# Patient Record
Sex: MALE | Race: WHITE | Employment: UNEMPLOYED | URBAN - NONMETROPOLITAN AREA
[De-identification: names, ages, dates, MRNs, and addresses within clinical notes are randomized per-mention and may not be internally consistent; named-entity substitution may affect disease eponyms.]

---

## 2021-06-19 ENCOUNTER — HOSPITAL ENCOUNTER (EMERGENCY)
Age: 56
Discharge: HOME | End: 2021-06-19
Attending: EMERGENCY MEDICINE

## 2021-06-19 VITALS
HEIGHT: 69 IN | SYSTOLIC BLOOD PRESSURE: 118 MMHG | RESPIRATION RATE: 18 BRPM | TEMPERATURE: 98 F | WEIGHT: 180 LBS | BODY MASS INDEX: 26.66 KG/M2 | DIASTOLIC BLOOD PRESSURE: 86 MMHG | OXYGEN SATURATION: 100 % | HEART RATE: 90 BPM

## 2021-06-19 DIAGNOSIS — S00.12XA PERIORBITAL ECCHYMOSIS OF LEFT EYE, INITIAL ENCOUNTER: ICD-10-CM

## 2021-06-19 DIAGNOSIS — H01.005 BLEPHARITIS OF LEFT LOWER EYELID, UNSPECIFIED TYPE: Primary | ICD-10-CM

## 2021-06-19 DIAGNOSIS — S00.81XA ABRASION OF FACE, INITIAL ENCOUNTER: ICD-10-CM

## 2021-06-19 PROCEDURE — 6370000000 HC RX 637 (ALT 250 FOR IP): Performed by: EMERGENCY MEDICINE

## 2021-06-19 PROCEDURE — 99283 EMERGENCY DEPT VISIT LOW MDM: CPT

## 2021-06-19 RX ORDER — ERYTHROMYCIN 5 MG/G
OINTMENT OPHTHALMIC ONCE
Status: COMPLETED | OUTPATIENT
Start: 2021-06-19 | End: 2021-06-19

## 2021-06-19 RX ORDER — ERYTHROMYCIN 5 MG/G
OINTMENT OPHTHALMIC EVERY 6 HOURS
Qty: 1 TUBE | Refills: 0 | Status: SHIPPED | OUTPATIENT
Start: 2021-06-19 | End: 2021-06-24

## 2021-06-19 RX ADMIN — ERYTHROMYCIN: 5 OINTMENT OPHTHALMIC at 19:58

## 2021-06-19 ASSESSMENT — PAIN DESCRIPTION - LOCATION: LOCATION: EYE

## 2021-06-19 ASSESSMENT — VISUAL ACUITY
OD: 20/25
OS: 20/25
OU: 20/15
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
## 2021-06-20 ASSESSMENT — ENCOUNTER SYMPTOMS
EYE REDNESS: 1
BACK PAIN: 0
VOMITING: 0
WHEEZING: 0
SHORTNESS OF BREATH: 0
COLOR CHANGE: 0
NAUSEA: 0
COUGH: 0
EYE DISCHARGE: 1
SORE THROAT: 0
CHEST TIGHTNESS: 0
SINUS PAIN: 1
SINUS PRESSURE: 0
EYE ITCHING: 1
EYE PAIN: 1
FACIAL SWELLING: 1
PHOTOPHOBIA: 0
ABDOMINAL PAIN: 0

## 2021-06-20 ASSESSMENT — TONOMETRY
OS_IOP_MMHG: 11
IOP_AUTOMATED: 1
OD_IOP_MMHG: 21

## 2022-12-26 ENCOUNTER — HOSPITAL ENCOUNTER (INPATIENT)
Age: 57
LOS: 8 days | Discharge: HOME | DRG: 885 | End: 2023-01-03
Attending: EMERGENCY MEDICINE | Admitting: PSYCHIATRY & NEUROLOGY
Payer: MEDICAID

## 2022-12-26 DIAGNOSIS — Z76.89 ENCOUNTER FOR PSYCHIATRIC ASSESSMENT: Primary | ICD-10-CM

## 2022-12-26 DIAGNOSIS — R45.851 SUICIDAL THOUGHTS: ICD-10-CM

## 2022-12-26 LAB
A/G RATIO: 1.3 (ref 1.1–2.2)
ACETAMINOPHEN LEVEL: <5 UG/ML (ref 10–30)
ALBUMIN SERPL-MCNC: 4.2 G/DL (ref 3.4–5)
ALP BLD-CCNC: 127 U/L (ref 40–129)
ALT SERPL-CCNC: 21 U/L (ref 10–40)
AMPHETAMINE SCREEN, URINE: POSITIVE
ANION GAP SERPL CALCULATED.3IONS-SCNC: 9 MMOL/L (ref 3–16)
AST SERPL-CCNC: 20 U/L (ref 15–37)
BARBITURATE SCREEN URINE: ABNORMAL
BASOPHILS ABSOLUTE: 0 K/UL (ref 0–0.2)
BASOPHILS RELATIVE PERCENT: 0.5 %
BENZODIAZEPINE SCREEN, URINE: ABNORMAL
BILIRUB SERPL-MCNC: <0.2 MG/DL (ref 0–1)
BUN BLDV-MCNC: 22 MG/DL (ref 7–20)
CALCIUM SERPL-MCNC: 9.3 MG/DL (ref 8.3–10.6)
CANNABINOID SCREEN URINE: POSITIVE
CHLORIDE BLD-SCNC: 102 MMOL/L (ref 99–110)
CO2: 27 MMOL/L (ref 21–32)
COCAINE METABOLITE SCREEN URINE: ABNORMAL
CREAT SERPL-MCNC: 1.3 MG/DL (ref 0.9–1.3)
EOSINOPHILS ABSOLUTE: 0.1 K/UL (ref 0–0.6)
EOSINOPHILS RELATIVE PERCENT: 1.2 %
ETHANOL: 11 MG/DL (ref 0–0.08)
FENTANYL SCREEN, URINE: ABNORMAL
GFR SERPL CREATININE-BSD FRML MDRD: >60 ML/MIN/{1.73_M2}
GLUCOSE BLD-MCNC: 124 MG/DL (ref 70–99)
HCT VFR BLD CALC: 43.7 % (ref 40.5–52.5)
HEMOGLOBIN: 14.4 G/DL (ref 13.5–17.5)
INFLUENZA A: NOT DETECTED
INFLUENZA B: NOT DETECTED
LYMPHOCYTES ABSOLUTE: 2.2 K/UL (ref 1–5.1)
LYMPHOCYTES RELATIVE PERCENT: 23.8 %
Lab: ABNORMAL
MCH RBC QN AUTO: 29.8 PG (ref 26–34)
MCHC RBC AUTO-ENTMCNC: 32.9 G/DL (ref 31–36)
MCV RBC AUTO: 90.5 FL (ref 80–100)
METHADONE SCREEN, URINE: ABNORMAL
MONOCYTES ABSOLUTE: 0.6 K/UL (ref 0–1.3)
MONOCYTES RELATIVE PERCENT: 7.1 %
NEUTROPHILS ABSOLUTE: 6.1 K/UL (ref 1.7–7.7)
NEUTROPHILS RELATIVE PERCENT: 67.4 %
OPIATE SCREEN URINE: ABNORMAL
OXYCODONE URINE: ABNORMAL
PDW BLD-RTO: 13.6 % (ref 12.4–15.4)
PH UA: 7
PHENCYCLIDINE SCREEN URINE: ABNORMAL
PLATELET # BLD: 250 K/UL (ref 135–450)
PMV BLD AUTO: 7.1 FL (ref 5–10.5)
POTASSIUM REFLEX MAGNESIUM: 3.9 MMOL/L (ref 3.5–5.1)
RBC # BLD: 4.83 M/UL (ref 4.2–5.9)
SALICYLATE, SERUM: <0.3 MG/DL (ref 15–30)
SARS-COV-2 RNA, RT PCR: NOT DETECTED
SODIUM BLD-SCNC: 138 MMOL/L (ref 136–145)
TOTAL PROTEIN: 7.4 G/DL (ref 6.4–8.2)
WBC # BLD: 9.1 K/UL (ref 4–11)

## 2022-12-26 PROCEDURE — 36415 COLL VENOUS BLD VENIPUNCTURE: CPT

## 2022-12-26 PROCEDURE — 1240000000 HC EMOTIONAL WELLNESS R&B

## 2022-12-26 PROCEDURE — 85025 COMPLETE CBC W/AUTO DIFF WBC: CPT

## 2022-12-26 PROCEDURE — 80307 DRUG TEST PRSMV CHEM ANLYZR: CPT

## 2022-12-26 PROCEDURE — 80053 COMPREHEN METABOLIC PANEL: CPT

## 2022-12-26 PROCEDURE — 99285 EMERGENCY DEPT VISIT HI MDM: CPT

## 2022-12-26 PROCEDURE — 82077 ASSAY SPEC XCP UR&BREATH IA: CPT

## 2022-12-26 PROCEDURE — 87636 SARSCOV2 & INF A&B AMP PRB: CPT

## 2022-12-26 PROCEDURE — 80143 DRUG ASSAY ACETAMINOPHEN: CPT

## 2022-12-26 PROCEDURE — 80179 DRUG ASSAY SALICYLATE: CPT

## 2022-12-26 ASSESSMENT — PAIN SCALES - GENERAL: PAINLEVEL_OUTOF10: 3

## 2022-12-26 ASSESSMENT — PAIN DESCRIPTION - FREQUENCY: FREQUENCY: CONTINUOUS

## 2022-12-26 ASSESSMENT — PAIN DESCRIPTION - LOCATION: LOCATION: BACK

## 2022-12-26 ASSESSMENT — PAIN DESCRIPTION - PAIN TYPE: TYPE: CHRONIC PAIN

## 2022-12-26 ASSESSMENT — PAIN - FUNCTIONAL ASSESSMENT: PAIN_FUNCTIONAL_ASSESSMENT: 0-10

## 2022-12-27 PROBLEM — F33.2 SEVERE EPISODE OF RECURRENT MAJOR DEPRESSIVE DISORDER, WITHOUT PSYCHOTIC FEATURES (HCC): Status: ACTIVE | Noted: 2022-12-26

## 2022-12-27 PROBLEM — F10.21 ALCOHOL USE DISORDER, SEVERE, IN SUSTAINED REMISSION (HCC): Status: ACTIVE | Noted: 2022-12-27

## 2022-12-27 PROBLEM — F15.10 METHAMPHETAMINE USE DISORDER, MILD (HCC): Status: ACTIVE | Noted: 2022-12-27

## 2022-12-27 PROBLEM — F14.21 COCAINE USE DISORDER, SEVERE, IN SUSTAINED REMISSION (HCC): Status: ACTIVE | Noted: 2022-12-27

## 2022-12-27 PROBLEM — G89.29 CHRONIC LOW BACK PAIN: Status: ACTIVE | Noted: 2022-12-27

## 2022-12-27 PROBLEM — F12.20 CANNABIS USE DISORDER, MODERATE, DEPENDENCE (HCC): Status: ACTIVE | Noted: 2022-12-27

## 2022-12-27 PROBLEM — M54.50 CHRONIC LOW BACK PAIN: Status: ACTIVE | Noted: 2022-12-27

## 2022-12-27 PROCEDURE — 6370000000 HC RX 637 (ALT 250 FOR IP): Performed by: PSYCHIATRY & NEUROLOGY

## 2022-12-27 PROCEDURE — 1240000000 HC EMOTIONAL WELLNESS R&B

## 2022-12-27 RX ORDER — CLONAZEPAM 1 MG/1
1 TABLET ORAL ONCE
Status: COMPLETED | OUTPATIENT
Start: 2022-12-27 | End: 2022-12-27

## 2022-12-27 RX ORDER — TRAZODONE HYDROCHLORIDE 50 MG/1
50 TABLET ORAL NIGHTLY PRN
Status: DISCONTINUED | OUTPATIENT
Start: 2022-12-27 | End: 2023-01-03 | Stop reason: HOSPADM

## 2022-12-27 RX ORDER — POLYETHYLENE GLYCOL 3350 17 G
2 POWDER IN PACKET (EA) ORAL
Status: DISCONTINUED | OUTPATIENT
Start: 2022-12-27 | End: 2023-01-03 | Stop reason: HOSPADM

## 2022-12-27 RX ORDER — ESCITALOPRAM OXALATE 10 MG/1
10 TABLET ORAL DAILY
Status: DISCONTINUED | OUTPATIENT
Start: 2022-12-28 | End: 2023-01-03 | Stop reason: HOSPADM

## 2022-12-27 RX ORDER — ACETAMINOPHEN 325 MG/1
650 TABLET ORAL EVERY 6 HOURS PRN
Status: DISCONTINUED | OUTPATIENT
Start: 2022-12-27 | End: 2022-12-28

## 2022-12-27 RX ORDER — HYDROXYZINE 50 MG/1
50 TABLET, FILM COATED ORAL 3 TIMES DAILY PRN
Status: DISCONTINUED | OUTPATIENT
Start: 2022-12-27 | End: 2023-01-03 | Stop reason: HOSPADM

## 2022-12-27 RX ORDER — ESCITALOPRAM OXALATE 10 MG/1
5 TABLET ORAL DAILY
Status: DISPENSED | OUTPATIENT
Start: 2022-12-27 | End: 2022-12-28

## 2022-12-27 RX ADMIN — CLONAZEPAM 1 MG: 1 TABLET ORAL at 21:11

## 2022-12-27 ASSESSMENT — SLEEP AND FATIGUE QUESTIONNAIRES
DO YOU HAVE DIFFICULTY SLEEPING: NO
AVERAGE NUMBER OF SLEEP HOURS: 12
DO YOU USE A SLEEP AID: NO
DO YOU USE A SLEEP AID: NO
AVERAGE NUMBER OF SLEEP HOURS: 10
DO YOU HAVE DIFFICULTY SLEEPING: NO

## 2022-12-27 ASSESSMENT — PAIN DESCRIPTION - DESCRIPTORS: DESCRIPTORS: ACHING;THROBBING

## 2022-12-27 ASSESSMENT — PATIENT HEALTH QUESTIONNAIRE - PHQ9
SUM OF ALL RESPONSES TO PHQ QUESTIONS 1-9: 18
SUM OF ALL RESPONSES TO PHQ QUESTIONS 1-9: 18

## 2022-12-27 ASSESSMENT — PAIN DESCRIPTION - FREQUENCY: FREQUENCY: CONTINUOUS

## 2022-12-27 ASSESSMENT — PAIN DESCRIPTION - LOCATION: LOCATION: BACK

## 2022-12-27 ASSESSMENT — PAIN DESCRIPTION - PAIN TYPE: TYPE: CHRONIC PAIN

## 2022-12-27 ASSESSMENT — PAIN DESCRIPTION - ORIENTATION: ORIENTATION: LOWER

## 2022-12-27 ASSESSMENT — PAIN - FUNCTIONAL ASSESSMENT: PAIN_FUNCTIONAL_ASSESSMENT: ACTIVITIES ARE NOT PREVENTED

## 2022-12-27 ASSESSMENT — PAIN SCALES - GENERAL: PAINLEVEL_OUTOF10: 3

## 2022-12-27 ASSESSMENT — LIFESTYLE VARIABLES
HOW OFTEN DO YOU HAVE A DRINK CONTAINING ALCOHOL: NEVER
HOW MANY STANDARD DRINKS CONTAINING ALCOHOL DO YOU HAVE ON A TYPICAL DAY: PATIENT DOES NOT DRINK

## 2022-12-27 NOTE — PROGRESS NOTES
Behavioral Services  Medicare Certification Upon Admission    I certify that this patient's inpatient psychiatric hospital admission is medically necessary for:    [x] (1) Treatment which could reasonably be expected to improve this patient's condition,       [x] (2) Or for diagnostic study;     AND     [x](2) The inpatient psychiatric services are provided while the individual is under the care of a physician and are included in the individualized plan of care.     Estimated length of stay/service 5-8 days    Plan for post-hospital care incomplete     Electronically signed by Carmelita Brown MD on 12/27/2022 at 2:16 PM

## 2022-12-27 NOTE — ED NOTES
Patient changed out into hospital attire for [facility]. His belongings placed in bag and secured in locker. Placed in [facility] room 3. Urine specimen obtained and sent to lab.       Saroj Clark RN  12/26/22 2026

## 2022-12-27 NOTE — ED TRIAGE NOTES
Pt states that he feels hopeless and feels like no one cares about him. He states that the only thing he had left was his cat and his cat she acted like she needed to go outside to [facility] and never came back. She's been gone 2 days therefore pt believes she is dead. Pt tearful at this time.

## 2022-12-27 NOTE — GROUP NOTE
Group Therapy Note    Date: 12/27/2022    Group Start Time: 9499  Group End Time: 1400  Group Topic: Psychoeducation    [address] [place] [address]        Group Therapy Note    Attendees: 7    Facilitator ran a group that focused on communication skills. Group discussed the five love languages and then completed the associated test. Group discussed the different languages and how they apply to their interactions with other people. Patient's Goal:  Participate in group discussion and complete five love languages test.    Notes:  Patient participated fully in group discussion and completed test.    Status After Intervention:  Improved    Participation Level:  Active Listener and Interactive    Participation Quality: Appropriate, Attentive, and Sharing      Speech:  normal      Thought Process/Content: Logical  Linear      Affective Functioning: Congruent      Mood: euthymic      Level of consciousness:  Alert, Oriented x4, and Attentive      Response to Learning: Able to verbalize current knowledge/experience, Able to verbalize/acknowledge new learning, Able to retain information, Capable of insight, and Able to change behavior      Endings: None Reported    Modes of Intervention: Education and Activity      Discipline Responsible: Behavorial Health Tech      Signature:  Jatin Valero

## 2022-12-27 NOTE — BH NOTE
.. [address] [facility]  Admission Note     Admission Type:   Admission Type: Voluntary    Reason for admission:  Reason for Admission: depression/ SI  States diiagnosed with bipolar disorder in the past. Taking no meds. Pt talks about cussing God at times and praying that God will allow him to die. It seems to be a delusion that he knows God is snickering at him all the time but that he doesn't actually here God. Pt states he wants to die because there is nothing to look forward to and has no active friends or family. Contracts safety at least while here. Addictive Behavior:   Addictive Behavior  In the Past 3 Months, Have You Felt or Has Someone Told You That You Have a Problem With  : Other (comment) (drug use)    Medical Problems:   History reviewed. No pertinent past medical history. Status EXAM:  Mental Status and Behavioral Exam  Normal: No  Level of Assistance: Independent/Self  Facial Expression: Sad, Worried  Affect: Blunt  Level of Consciousness: Alert  Frequency of Checks: 4 times per hour, close  Mood:Normal: No  Mood: Depressed, Anxious, Sad  Motor Activity:Normal: No (Pt has a back problem but is able to ambulate safely)  Motor Activity: Unusual posture/gait (Pt's gait is normal but has some bending forward a bit due to back issues)  Eye Contact: Good  Observed Behavior: Cooperative, Friendly, Tearful  Sexual Misconduct History: Current - no  Preception: [place] to person, [place] to time, [place] to place, [place] to situation  Attention:Normal: Yes  Thought Processes: Perseveration  Thought Content:Normal: No  Thought Content: Delusions  Depression Symptoms: Crying  Anxiety Symptoms: Other (comment), Generalized (anxiety)  Mony Symptoms: Poor judgment  Hallucinations:  Auditory (comment) (possible)  Delusions: Yes  Memory:Normal: Yes (difficult to assess)  Memory:  (demario)  Insight and Judgment: No  Insight and Judgment: Poor judgment, Poor insight    Tobacco Screening:  Practical Counseling, on admission, ilya X, if applicable and completed (first 3 are required if patient doesn't refuse)ref:            ( ) Recognizing danger situations (included triggers and roadblocks)                    ( ) Coping skills (new ways to manage stress,relaxation techniques, changing routine, distraction)                                                           ( x) Basic information about quitting (benefits of quitting, techniques in how to quit, available resources  ( ) Referral for counseling faxed to [facility]                                                                                                                   ( x) Patient refused counseling  ( ) Patient has not smoked in the last 30 days    Metabolic Screening:    No results found for: LABA1C    No results found for: CHOL  No results found for: TRIG  No results found for: HDL  No components found for: LDLCAL  No results found for: LABVLDL      Body mass index is 25.1 kg/m². BP Readings from Last 2 Encounters:   12/27/22 111/75   06/19/21 118/86     Pt has a bad back which affects his posture. Gait steady. Denies any prescribed meds. Hx of using marijuana several times per week. Uses methamphetamine 2-4 times per mo.       Pt admitted with followings belongings:  Dental Appliances: None  Vision - Corrective Lenses: Eyeglasses (for reading)  Hearing Aid: None  Jewelry: None  Body Piercings Removed: N/A  Clothing: Gloves, Jacket/Coat, Footwear, Socks, Pants, Shirt  Other Valuables: Wallet, Cigarettes, Lighter/Matches, Personal Toiletries    Shasta Nuno RN

## 2022-12-27 NOTE — ED NOTES
Patient transported to [facility] with security and one staff member by wheelchair with all belongings.       Darby Agosto RN  12/26/22 9826
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
## 2022-12-27 NOTE — BH NOTE
[address] [facility]  Treatment Team Note  Review Date & Time: 12/27/22 0920      Patient was not in treatment team      Status EXAM:   Mental Status and Behavioral Exam  Normal: No  Level of Assistance: Independent/Self  Facial Expression: Sad, Worried  Affect: Blunt  Level of Consciousness: Alert  Frequency of Checks: 4 times per hour, close  Mood:Normal: No  Mood: Depressed, Anxious, Sad  Motor Activity:Normal: No (Pt has a back problem but is able to ambulate safely)  Motor Activity: Unusual posture/gait (Pt's gait is normal but has some bending forward a bit due to back issues)  Eye Contact: Good  Observed Behavior: Cooperative, Friendly, Tearful  Sexual Misconduct History: Current - no  Preception: Stig[place] to person, Stig[place] to time, Stig[place] to place, [place] to situation  Attention:Normal: Yes  Thought Processes: Perseveration  Thought Content:Normal: No  Thought Content: Delusions  Depression Symptoms: Crying  Anxiety Symptoms: Other (comment), Generalized (anxiety)  Mony Symptoms: Poor judgment  Hallucinations: Auditory (comment) (possible)  Delusions: Yes  Memory:Normal: Yes (difficult to assess)  Memory:  (demario)  Insight and Judgment: No  Insight and Judgment: Poor judgment, Poor insight      Suicide Risk CSSR-S:  1) Within the past month, have you wished you were dead or wished you could go to sleep and not wake up? : Yes  2) Have you actually had any thoughts of killing yourself? : Yes  3) Have you been thinking about how you might kill yourself? : Yes  5) Have you started to work out or worked out the details of how to kill yourself?  Do you intend to carry out this plan? : No  6) Have you ever done anything, started to do anything, or prepared to do anything to end your life?: Yes      PLAN/TREATMENT RECOMMENDATIONS UPDATE: Patient will take medication as prescribed, eat 75% of meals, attend groups, participate in milieu activities, participate in treatment team and care planning for discharge and follow up.            Christy Nails, RN
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
## 2022-12-27 NOTE — ED NOTES
Presenting Problem:Patient presents to [facility] on a SOB by [facility] Dept due to patient calling suicide hotline and stating he wanted to kill self. When [facility] arrived, he voiced suicidal ideations but would not give his plan  Upon interview, patient is A/O x 4. Patient endorses suicidal ideation with intent to hang self. Patient states \"I have nothing to live for\". He reports that his only , his cat, is gone and he presumes it froze to death. Patient reports suicidal attempt in the 90's by OD and was seen here at [facility]. Patient stopped taking antidepressants because it made him \"feel nothing\". Patient reports being homeless and he has no one to contact. He reports last methamphetamine use was 1 week ago. Patient with rocking motion and foot twitching when interviewing. Patient avoids eye contact and speech slightly pressured but comprehensible. Patient denies A/V hallucinations. Patient contracts for safety but reports if discharged he will find a way to hang himself. Appearance/Hygiene:  hospital attire and unkept  Motor Behavior: rocking back and forth and feet twitching   Attitude: cooperative  Affect: depressed affect   Speech: pressured  Mood: depressed   Thought Processes: [place]  Perceptions: Absent   Thought content: depressed   Orientation: A&Ox4   Memory: intact  Concentration: Fair    Insight/ judgement: impaired insight      Psychosocial and contextual factors: homeless    C-SSRS flowsheet is complete. Psychiatric History (including current outpatient provider and past inpatient admissions): reports suicide attempt in 1990s by OD. Stopped taking meds because they made him not have feelings.      Access to Firearms: no    ASSESSMENT FOR IMMINENT FUTURE DANGER:    RISK FACTORS:    [x]  Age <25 or >49   [x]  Male gender   [x]  Depressed mood   [x]  Active suicidal ideation   [x]  Suicide plan   []  Suicide attempt   [x]  Access to lethal means   [x]  Prior suicide attempt [x]  Active substance abuse (methamphetamine)   []  Highly impulsive behaviors   [x]  Not attending to self-care/ADLs    [x]  Recent significant loss, lost his cat 2 days ago   [x]  Chronic pain or medical illness   [x]  Social isolation   []  History of violence (if yes please elaborate )   []  Active psychosis   []  Cognitive impairment    [x]  No outpatient services in place   [x]  Medication noncompliance   [x]  No collateral information to support safety  [] Self- injurious/ Self-harm behavior    PROTECTIVE FACTORS:  [] Age >25 and <55  [] Female gender   [] Denies depression  [] Denies suicidal ideation  [] Does not have lethal plan   [] Does not have access to guns or weapons  [] Patient is verbally walter for safety  [] No prior suicide attempts  [] No active substance abuse  [] Patient has social or family support  [] No active psychosis or cognitive dysfunction  [] Physically healthy  [] Has outpatient services in place  [] Compliant with recommended medications  [] Collateral information from  supports patient safety   [] Patient is future oriented with plans to           Jael Shah RN  12/26/22 2139       Jael Shah RN  12/26/22 2139
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
## 2022-12-28 ENCOUNTER — APPOINTMENT (OUTPATIENT)
Dept: GENERAL RADIOLOGY | Age: 57
DRG: 885 | End: 2022-12-28
Payer: MEDICAID

## 2022-12-28 PROBLEM — R68.3 CLUBBING OF FINGERS: Status: ACTIVE | Noted: 2022-12-28

## 2022-12-28 PROCEDURE — 1240000000 HC EMOTIONAL WELLNESS R&B

## 2022-12-28 PROCEDURE — 6370000000 HC RX 637 (ALT 250 FOR IP): Performed by: PSYCHIATRY & NEUROLOGY

## 2022-12-28 PROCEDURE — 71046 X-RAY EXAM CHEST 2 VIEWS: CPT

## 2022-12-28 RX ORDER — IBUPROFEN 800 MG/1
800 TABLET ORAL EVERY 8 HOURS PRN
Status: DISCONTINUED | OUTPATIENT
Start: 2022-12-28 | End: 2022-12-29

## 2022-12-28 RX ORDER — IBUPROFEN 800 MG/1
800 TABLET ORAL ONCE
Status: COMPLETED | OUTPATIENT
Start: 2022-12-28 | End: 2022-12-28

## 2022-12-28 RX ADMIN — ACETAMINOPHEN 650 MG: 325 TABLET ORAL at 09:43

## 2022-12-28 RX ADMIN — IBUPROFEN 800 MG: 800 TABLET, FILM COATED ORAL at 12:25

## 2022-12-28 RX ADMIN — TRAZODONE HYDROCHLORIDE 50 MG: 50 TABLET ORAL at 20:39

## 2022-12-28 RX ADMIN — ESCITALOPRAM OXALATE 10 MG: 10 TABLET ORAL at 09:44

## 2022-12-28 RX ADMIN — HYDROXYZINE HYDROCHLORIDE 50 MG: 50 TABLET ORAL at 20:39

## 2022-12-28 ASSESSMENT — PAIN DESCRIPTION - DESCRIPTORS: DESCRIPTORS: ACHING

## 2022-12-28 ASSESSMENT — PAIN SCALES - GENERAL
PAINLEVEL_OUTOF10: 5
PAINLEVEL_OUTOF10: 6
PAINLEVEL_OUTOF10: 0

## 2022-12-28 ASSESSMENT — PAIN DESCRIPTION - LOCATION
LOCATION: BACK
LOCATION: BACK

## 2022-12-28 ASSESSMENT — PAIN - FUNCTIONAL ASSESSMENT
PAIN_FUNCTIONAL_ASSESSMENT: PREVENTS OR INTERFERES SOME ACTIVE ACTIVITIES AND ADLS
PAIN_FUNCTIONAL_ASSESSMENT: ACTIVITIES ARE NOT PREVENTED

## 2022-12-28 ASSESSMENT — PAIN DESCRIPTION - ORIENTATION
ORIENTATION: LOWER
ORIENTATION: MID;LOWER

## 2022-12-28 NOTE — GROUP NOTE
Group Therapy Note    Date: 12/28/2022    Group Start Time: 1300  Group End Time: 1985  Group Topic: Psychoeducation    3 [facility]        Group Therapy Note    Topic: Acceptance, Denial/Resistance    Group members processed role of acceptance in recovery, denial and resistance as direct opposition to acceptance. Members set goals and scripted affirmations to increase validation of emotions while empowering acceptance. Attendees: 6       Notes: This pt was present and actively engaged across group process, reflecting on role of acceptance in personal accountability, avoidance strategies - actively collaborative with peers while scripting affirmations. Status After Intervention:  Improved    Participation Level:  Active Listener and Interactive    Participation Quality: Appropriate and Attentive      Speech:  normal      Thought Process/Content: Logical      Affective Functioning: Congruent      Mood: euthymic      Level of consciousness:  Alert and Oriented x4      Response to Learning: Capable of insight and Progressing to goal      Endings: None Reported    Modes of Intervention: Education, Support, Socialization, Exploration, and Activity      Discipline Responsible: Psychoeducational Specialist      Signature:  Bret Neal MM, MT-BC

## 2022-12-28 NOTE — GROUP NOTE
Group Therapy Note    Date: 12/28/2022    Group Start Time: 1000  Group End Time: 1130  Group Topic: Psychoeducation    [address] [place] [address]        Group Therapy Note    Attendees: 9    Facilitator ran a coping skills group activity, followed by group discussion that processed the activity experience and coping skills. The group was split into two teams and presented with an \"escape room\" activity that involved completing four tasks centered around coping and stress management. Facilitator directed group through each task; providing instructions, hints, and positive reinforcement of observed coping skills and team work. Group ended with the teams coming together and discussing their experience, focusing on what went well and how coping skills were throughout the experience. Patient's Goal:  Participate fully in group activity, work with other team members to accomplish group tasks, and engage in group discussion. Notes:  Patient participated fully in activity and was an active member in the team building experience. Status After Intervention:  Improved    Participation Level:  Active Listener and Interactive    Participation Quality: Appropriate, Attentive, and Sharing      Speech:  normal      Thought Process/Content: Logical  Linear      Affective Functioning: Congruent      Mood: euthymic      Level of consciousness:  Alert, Oriented x4, and Attentive      Response to Learning: Able to verbalize current knowledge/experience, Able to verbalize/acknowledge new learning, Able to retain information, Capable of insight, Able to change behavior, and Progressing to goal      Endings: None Reported    Modes of Intervention: Education, Support, Socialization, Problem-solving, and Activity      Discipline Responsible: Behavorial Health Tech      Signature:  Jossue aZmora

## 2022-12-28 NOTE — PLAN OF CARE
Problem: Pain  Goal: Verbalizes/displays adequate comfort level or baseline comfort level  Outcome: Progressing     Problem: Anxiety  Goal: Will report anxiety at manageable levels  Description: INTERVENTIONS:  1. Administer medication as ordered  2. Teach and rehearse alternative coping skills  3. Provide emotional support with 1:1 interaction with staff  Outcome: Progressing   Pt has been somewhat isolative but out for snack and meds. One time klonopin ordered and given HS. Pt does endorse some anxiety and denies SI/HI/A/V/H. His eye contact is fair. He is cooperative with interview, but presents flat and sad/worried. He endorses some chonic back pain but declines tylenol. Pt aware he has a prn order for tylenol and trazodone if needs something to help him sleep.

## 2022-12-28 NOTE — BH NOTE
Patient has been out of his room social with peers and staff. He denies suicidal ideations and hallucinations. He is not RTIS. He shared how he has felt depressed for a long time with anxiety and seems discouraged it will go away. He shared his guilty feelings for not doing more around the house. He talked about how he stopped drinking alcohol and has been trying to do the right things but despite this nothing is getting better. Nurse allowed patient time to vent his feelings. He shared his concerns over a car that is not running and financial distress. He shared his frustrations over working many years and how the [address] currently is discouraging him. He wants to go home but he does not seem to be improving despite his medications being adjusted. His affect is flat, hopeless, and unmotivated.

## 2022-12-28 NOTE — PLAN OF CARE
Problem: Self Harm/Suicidality  Goal: Will have no self-injury during hospital stay  Description: INTERVENTIONS:  1. Ensure constant observer at bedside with Q15M safety checks  2. Maintain a safe environment  3. Secure patient belongings  4. Ensure family/visitors adhere to safety recommendations  5. Ensure safety tray has been added to patient's diet order  6. Every shift and PRN: Re-assess suicidal risk via Frequent Screener    Outcome: Not Progressing     Problem: Pain  Goal: Verbalizes/displays adequate comfort level or baseline comfort level  Outcome: Not Progressing     Problem: Anxiety  Goal: Will report anxiety at manageable levels  Description: INTERVENTIONS:  1. Administer medication as ordered  2. Teach and rehearse alternative coping skills  3. Provide emotional support with 1:1 interaction with staff  Outcome: Not Progressing  Flowsheets (Taken 12/28/2022 1721)  Will report anxiety at manageable levels: Administer medication as ordered     Problem: Confusion  Goal: Confusion, delirium, dementia, or psychosis is improved or at baseline  Description: INTERVENTIONS:  1. Assess for possible contributors to thought disturbance, including medications, impaired vision or hearing, underlying metabolic abnormalities, dehydration, psychiatric diagnoses, and notify attending LIP  2. [place] high risk fall precautions, as indicated  3. Provide frequent short contacts to provide reality reorientation, refocusing and direction  4. Decrease environmental stimuli, including noise as appropriate  5. Monitor and intervene to maintain adequate nutrition, hydration, elimination, sleep and activity  6. If unable to ensure safety without constant attention obtain sitter and review sitter guidelines with assigned personnel  7.  Initiate Psychosocial CNS and Spiritual Care consult, as indicated  Outcome: Not Progressing  Flowsheets (Taken 12/28/2022 1721)  Effect of thought disturbance (confusion, delirium, dementia, or psychosis) are managed with adequate functional status: Assess for contributors to thought disturbance, including medications, impaired vision or hearing, underlying metabolic abnormalities, dehydration, psychiatric diagnoses, notify LIP     Problem: Depression/Self Harm  Goal: Effect of psychiatric condition will be minimized and patient will be protected from self harm  Description: INTERVENTIONS:  1. Assess impact of patient's symptoms on level of functioning, self care needs and offer support as indicated  2. Assess patient/family knowledge of depression, impact on illness and need for teaching  3. Provide emotional support, presence and reassurance  4. Assess for possible suicidal thoughts or ideation. If patient expresses suicidal thoughts or statements do not leave alone, initiate Suicide Precautions, move to a room close to the nursing station and obtain sitter  5. Initiate consults as appropriate with Mental Health Professional, Spiritual Care, Psychosocial CNS, and consider a recommendation to the LIP for a Psychiatric Consultation  Outcome: Not Progressing  Flowsheets (Taken 12/28/2022 1721)  Effect of psychiatric condition will be minimized and patient will be protected from self harm: Assess impact of patients symptoms on level of functioning, self care needs and offer support as indicated     Problem: Drug Abuse/Detox  Goal: Will have no detox symptoms and will verbalize plan for changing drug-related behavior  Description: INTERVENTIONS:  1. Administer medication as ordered  2. Monitor physical status  3. Provide emotional support with 1:1 interaction with staff  4.  Encourage  recovery focused treatment   Outcome: Not Progressing  Flowsheets (Taken 12/28/2022 1721)  Will have no detox symptoms and will verbalize plan for changing drug-related behavior: Administer medication as ordered

## 2022-12-28 NOTE — PROGRESS NOTES
Department of Psychiatry  Progress Note    Patient's chart was reviewed. Discussed with treatment team. Met with patient. SUBJECTIVE:      Took Lexparo 10 this morning. Tolerating it well so far. Says he wasn't offered Lexparo 5 yesterday. Interested in treatment. Has been active in the milieu/programming. Remains depressed and tearful. ROS:   Patient has new complaints: no  Sleeping adequately:  Yes   Appetite adequate: Yes  Engaged in programming: Yes    OBJECTIVE:  VITALS:  /75   Pulse 64   Temp 97.8 °F (36.6 °C) (Oral)   Resp 18   Ht 5' 9\" (1.753 m)   Wt 170 lb (77.1 kg)   SpO2 97%   BMI 25.10 kg/m²     Mental Status Examination:    Appearance: poor grooming and hygiene  Behavior/Attitude toward examiner: fair eye contact  Speech: Normal rate, volume, amount  Mood:  \"down\"  Affect:  tearful    Thought processes:  Goal directed, linear, no ESTEPHANIA or gross disorganization  Thought Content: + SI, no HI, no delusions voiced, no obsessions  Perceptions: no AVH  Attention: attention span and concentration were intact to interview   Abstraction: intact  Cognition:  Alert and oriented to person, place, time, and situation, recall intact  Insight: fair  Judgment: fair    Medication:  Scheduled:   ibuprofen  800 mg Oral Once    escitalopram  10 mg Oral Daily        PRN:  ibuprofen, hydrOXYzine HCl, traZODone, nicotine polacrilex     FORMULATION:  This is a homeless, , and unemployed 68-year-old  with a self-reported history of mood and substance use disorders, who  was brought to our emergency department on a statement of belief by the  [facility]'s deputies after calling 911. He meets criteria for major  depressive disorder, recurrent, severe, without psychotic features. He  also meets criteria for substance use disorders.     Principal Problem:    Severe episode of recurrent major depressive disorder, without psychotic features ([address]  Active Problems:    Cannabis use disorder, moderate, dependence (HCC)    Methamphetamine use disorder, mild (HCC)    Alcohol use disorder, severe, in sustained remission (HCC)    Cocaine use disorder, severe, in sustained remission (HCC)    Chronic low back pain    Clubbing of fingers  Resolved Problems:    * No resolved hospital problems. *     PLAN:  1. Admitted to Psychiatry for stabilization and treatment. 2.  On admission, ordered Lexparo 5mg to start,  q.15-minute checks for safety, programming, and p.r.n. medication for anxiety, agitation, and insomnia. 12/28/2022 - received 10mg of Lexparo this morning. Missed yesterday's dose. 3.  Hospitalist consult for admission. Clubbing on bilateral fingernails and toenails  - hx of smoking  - check chest x-ray     Overgrown toenails  Fungal growth left great toe  - podiatry consulted     Low back pain  Ibuprofen 800mg PRN    4.  admitted on a statement of belief, but he agreed to stay voluntarily. Total time with patient was 35 minutes and more than 50 % of that time was spent counseling the patient on their symptoms, treatment, and expected goals.      Glo Rivera MD

## 2022-12-29 PROCEDURE — 6370000000 HC RX 637 (ALT 250 FOR IP): Performed by: PSYCHIATRY & NEUROLOGY

## 2022-12-29 PROCEDURE — 1240000000 HC EMOTIONAL WELLNESS R&B

## 2022-12-29 RX ORDER — MELOXICAM 15 MG/1
15 TABLET ORAL DAILY
Status: DISCONTINUED | OUTPATIENT
Start: 2022-12-29 | End: 2023-01-03 | Stop reason: HOSPADM

## 2022-12-29 RX ORDER — ACETAMINOPHEN 325 MG/1
650 TABLET ORAL EVERY 6 HOURS PRN
Status: DISCONTINUED | OUTPATIENT
Start: 2022-12-29 | End: 2023-01-03 | Stop reason: HOSPADM

## 2022-12-29 RX ADMIN — ESCITALOPRAM OXALATE 10 MG: 10 TABLET ORAL at 08:22

## 2022-12-29 RX ADMIN — MELOXICAM 15 MG: 15 TABLET ORAL at 17:36

## 2022-12-29 RX ADMIN — IBUPROFEN 800 MG: 800 TABLET, FILM COATED ORAL at 07:46

## 2022-12-29 ASSESSMENT — PAIN SCALES - GENERAL: PAINLEVEL_OUTOF10: 0

## 2022-12-29 NOTE — PROGRESS NOTES
Group Therapy Note    Date: 12/29/2022  Start Time: 1300  End Time:  1400  Number of Participants: 9    Type of Group: Music Group    Notes:  Pt present for Music Group. Pt participated by making song selections. Participation Level:  Active Listener and Interactive    Participation Quality: Appropriate and Attentive      Speech:  normal      Affective Functioning: Congruent      Endings: None Reported    Modes of Intervention: Support, Socialization, Activity, and Media      Discipline Responsible: Chaplain Akash Murphy       12/29/22 1442   Encounter Summary   Encounter Overview/Reason  Behavioral Health   Service Provided For: Patient   Last Encounter    (12/29 Music Group)   Complexity of Encounter Moderate   Begin Time 1300   End Time  1400   Total Time Calculated 60 min   Behavioral Health    Type  Spirituality Group

## 2022-12-29 NOTE — PLAN OF CARE
Problem: Self Harm/Suicidality  Goal: Will have no self-injury during hospital stay  Description: INTERVENTIONS:  1. Ensure constant observer at bedside with Q15M safety checks  2. Maintain a safe environment  3. Secure patient belongings  4. Ensure family/visitors adhere to safety recommendations  5. Ensure safety tray has been added to patient's diet order  6. Every shift and PRN: Re-assess suicidal risk via Frequent Screener    12/28/2022 1735 by Farris Meckel, RN  Outcome: Not Progressing     Problem: Pain  Goal: Verbalizes/displays adequate comfort level or baseline comfort level  12/28/2022 1735 by Farris Meckel, RN  Outcome: Not Progressing     Problem: Anxiety  Goal: Will report anxiety at manageable levels  Description: INTERVENTIONS:  1. Administer medication as ordered  2. Teach and rehearse alternative coping skills  3. Provide emotional support with 1:1 interaction with staff  12/28/2022 1735 by Farris Meckel, RN  Outcome: Not Progressing  Flowsheets (Taken 12/28/2022 1721)  Will report anxiety at manageable levels: Administer medication as ordered     Problem: Confusion  Goal: Confusion, delirium, dementia, or psychosis is improved or at baseline  Description: INTERVENTIONS:  1. Assess for possible contributors to thought disturbance, including medications, impaired vision or hearing, underlying metabolic abnormalities, dehydration, psychiatric diagnoses, and notify attending LIP  2. [place] high risk fall precautions, as indicated  3. Provide frequent short contacts to provide reality reorientation, refocusing and direction  4. Decrease environmental stimuli, including noise as appropriate  5. Monitor and intervene to maintain adequate nutrition, hydration, elimination, sleep and activity  6. If unable to ensure safety without constant attention obtain sitter and review sitter guidelines with assigned personnel  7.  Initiate Psychosocial CNS and Spiritual Care consult, as indicated  12/28/2022 [id number] by Bria Ballard, RN  Outcome: Not Progressing  Flowsheets (Taken 12/28/2022 1721)  Effect of thought disturbance (confusion, delirium, dementia, or psychosis) are managed with adequate functional status: Assess for contributors to thought disturbance, including medications, impaired vision or hearing, underlying metabolic abnormalities, dehydration, psychiatric diagnoses, notify LIP     Problem: Depression/Self Harm  Goal: Effect of psychiatric condition will be minimized and patient will be protected from self harm  Description: INTERVENTIONS:  1. Assess impact of patient's symptoms on level of functioning, self care needs and offer support as indicated  2. Assess patient/family knowledge of depression, impact on illness and need for teaching  3. Provide emotional support, presence and reassurance  4. Assess for possible suicidal thoughts or ideation. If patient expresses suicidal thoughts or statements do not leave alone, initiate Suicide Precautions, move to a room close to the nursing station and obtain sitter  5. Initiate consults as appropriate with Mental Health Professional, Spiritual Care, Psychosocial CNS, and consider a recommendation to the LIP for a Psychiatric Consultation  12/29/2022 0449 by Al Ledbetter RN  Outcome: Not Progressing  12/28/2022 1735 by Bria Ballard RN  Outcome: Not Progressing  Flowsheets (Taken 12/28/2022 1721)  Effect of psychiatric condition will be minimized and patient will be protected from self harm: Assess impact of patients symptoms on level of functioning, self care needs and offer support as indicated     Problem: Drug Abuse/Detox  Goal: Will have no detox symptoms and will verbalize plan for changing drug-related behavior  Description: INTERVENTIONS:  1. Administer medication as ordered  2. Monitor physical status  3. Provide emotional support with 1:1 interaction with staff  4.  Encourage  recovery focused treatment   12/28/2022 1735 by Bria Ballard RN  Outcome: Not Progressing  Flowsheets (Taken 12/28/2022 1721)  Will have no detox symptoms and will verbalize plan for changing drug-related behavior: Administer medication as ordered     Denies all. No evening scheduled meds however received prn hydroxyzine/trazodone; effective. Fixated on necessity for him to be moved to a room with a medical bed so that he can elevate his feet to reduce his chronic back pain. After denial of room change pt resorted to room for the rest of shift. (-) group. Slept well.
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
## 2022-12-30 PROCEDURE — 6370000000 HC RX 637 (ALT 250 FOR IP): Performed by: PSYCHIATRY & NEUROLOGY

## 2022-12-30 PROCEDURE — 1240000000 HC EMOTIONAL WELLNESS R&B

## 2022-12-30 RX ADMIN — MELOXICAM 15 MG: 15 TABLET ORAL at 09:10

## 2022-12-30 RX ADMIN — TRAZODONE HYDROCHLORIDE 50 MG: 50 TABLET ORAL at 22:18

## 2022-12-30 RX ADMIN — ACETAMINOPHEN 650 MG: 325 TABLET ORAL at 17:38

## 2022-12-30 RX ADMIN — ESCITALOPRAM OXALATE 10 MG: 10 TABLET ORAL at 09:10

## 2022-12-30 RX ADMIN — ACETAMINOPHEN 650 MG: 325 TABLET ORAL at 06:41

## 2022-12-30 ASSESSMENT — PAIN SCALES - GENERAL
PAINLEVEL_OUTOF10: 9
PAINLEVEL_OUTOF10: 2
PAINLEVEL_OUTOF10: 5
PAINLEVEL_OUTOF10: 4

## 2022-12-30 ASSESSMENT — PAIN DESCRIPTION - ORIENTATION
ORIENTATION: LOWER

## 2022-12-30 ASSESSMENT — PAIN DESCRIPTION - LOCATION
LOCATION: BACK
LOCATION: BACK
LOCATION: BACK;LEG
LOCATION: BACK

## 2022-12-30 ASSESSMENT — PAIN DESCRIPTION - DESCRIPTORS
DESCRIPTORS: ACHING
DESCRIPTORS: ACHING;DISCOMFORT

## 2022-12-30 ASSESSMENT — PAIN - FUNCTIONAL ASSESSMENT: PAIN_FUNCTIONAL_ASSESSMENT: PREVENTS OR INTERFERES SOME ACTIVE ACTIVITIES AND ADLS

## 2022-12-30 NOTE — PLAN OF CARE
Problem: Self Harm/Suicidality  Goal: Will have no self-injury during hospital stay  Description: INTERVENTIONS:  1. Ensure constant observer at bedside with Q15M safety checks  2. Maintain a safe environment  3. Secure patient belongings  4. Ensure family/visitors adhere to safety recommendations  5. Ensure safety tray has been added to patient's diet order  6. Every shift and PRN: Re-assess suicidal risk via Frequent Screener    Outcome: Progressing     Problem: Pain  Goal: Verbalizes/displays adequate comfort level or baseline comfort level  Outcome: Progressing      12/30/22 1124   Mental Status and Behavioral Exam   Normal No   Level of Assistance Independent/Self   Facial Expression Flat   Affect Stable   Level of Consciousness Alert   Frequency of Checks 4 times per hour, close   Mood:Normal No   Mood Depressed   Motor Activity:Normal No   Motor Activity Decreased   Eye Contact Fair   Observed Behavior Cooperative;Friendly   Sexual Misconduct History Current - no   Preception [place] to person;[place] to time;[place] to place;[place] to situation   Attention:Normal Yes   Thought Processes Circumstantial   Thought Content:Normal Yes   Depression Symptoms Feelings of hopelessess; Impaired concentration   Mony Symptoms Poor judgment   Hallucinations None   Delusions No   Memory:Normal No   Insight and Judgment No   Insight and Judgment Poor judgment;Poor insight   Patient has been visible and attended groups this shift. Paty Eugene has been medication compliant and currently denies SI/HI and AVH. Will continue to monitor for safety.

## 2022-12-30 NOTE — GROUP NOTE
Group Therapy Note    Date: 12/30/2022    Group Start Time: 1100  Group End Time: 5739  Group Topic: Psychoeducation    [address] [place] [address]        Group Therapy Note    Attendees: 7    Facilitator led group discussion that focused on locus of control. Patients were provided large sheets of paper and instructed to draw two large over lapping circles. Patients were instructed to identify things in their lives that mattered to them and write them down inside the first [place]. Patients then identified things they can control and wrote them in the second [place]. Facilitator then asked members the share items from the \"things that matter\" [place] and we discussed as a group if those were things we could control. Some items were broken down to find elements that could be controlled. In the middle section, members were asked to think about small steps they can take to focus their efforts on items within their control. The group ended with members being instructed to complete the sentence: I will let go of ______ and focus my energy on ______. Patient's Goal:  Participate in group discussion and identify areas that are with the patients control. Notes:  Patient was fully engaged in the locus of control activity and contributed appropriately to the group discussion. Patient identified several areas of control. Status After Intervention:  Improved    Participation Level:  Active Listener and Interactive    Participation Quality: Appropriate, Attentive, and Sharing      Speech:  normal      Thought Process/Content: Logical  Linear      Affective Functioning: Congruent      Mood: euthymic      Level of consciousness:  Alert, Oriented x4, and Attentive      Response to Learning: Capable of insight, Able to change behavior, and Progressing to goal      Endings: None Reported    Modes of Intervention: Education, Problem-solving, and Activity      Discipline Responsible: [employer]      Signature:  Shi Trent

## 2022-12-30 NOTE — PLAN OF CARE
Withdrawn to room, sleeping majority of shift. Came out for snack. Stated lower back hurt but refused PRNs. States more comfortable in hospital bed. No scheduled meds, no PRNs requested. Denies all, CFS. Problem: Self Harm/Suicidality  Goal: Will have no self-injury during hospital stay  Description: INTERVENTIONS:  1. Ensure constant observer at bedside with Q15M safety checks  2. Maintain a safe environment  3. Secure patient belongings  4. Ensure family/visitors adhere to safety recommendations  5. Ensure safety tray has been added to patient's diet order  6. Every shift and PRN: Re-assess suicidal risk via Frequent Screener    Outcome: Progressing     Problem: Anxiety  Goal: Will report anxiety at manageable levels  Description: INTERVENTIONS:  1. Administer medication as ordered  2. Teach and rehearse alternative coping skills  3. Provide emotional support with 1:1 interaction with staff  Outcome: Progressing     Problem: Confusion  Goal: Confusion, delirium, dementia, or psychosis is improved or at baseline  Description: INTERVENTIONS:  1. Assess for possible contributors to thought disturbance, including medications, impaired vision or hearing, underlying metabolic abnormalities, dehydration, psychiatric diagnoses, and notify attending LIP  2. [place] high risk fall precautions, as indicated  3. Provide frequent short contacts to provide reality reorientation, refocusing and direction  4. Decrease environmental stimuli, including noise as appropriate  5. Monitor and intervene to maintain adequate nutrition, hydration, elimination, sleep and activity  6. If unable to ensure safety without constant attention obtain sitter and review sitter guidelines with assigned personnel  7.  Initiate Psychosocial CNS and Spiritual Care consult, as indicated  Outcome: Progressing     Problem: Depression/Self Harm  Goal: Effect of psychiatric condition will be minimized and patient will be protected from self harm  Description: INTERVENTIONS:  1. Assess impact of patient's symptoms on level of functioning, self care needs and offer support as indicated  2. Assess patient/family knowledge of depression, impact on illness and need for teaching  3. Provide emotional support, presence and reassurance  4. Assess for possible suicidal thoughts or ideation. If patient expresses suicidal thoughts or statements do not leave alone, initiate Suicide Precautions, move to a room close to the nursing station and obtain sitter  5.  Initiate consults as appropriate with Mental Health Professional, Spiritual Care, Psychosocial CNS, and consider a recommendation to the LIP for a Psychiatric Consultation  Outcome: Progressing     Problem: Pain  Goal: Verbalizes/displays adequate comfort level or baseline comfort level  Outcome: Not Progressing

## 2022-12-30 NOTE — PROGRESS NOTES
Department of Psychiatry  Progress Note    Patient's chart was reviewed. Discussed with treatment team. Met with patient. SUBJECTIVE:      Less depressed and anxious; more active in the milieu/programming. SI resolved. Mobic and prn tylenol has been helpful so far. ROS:   Patient has new complaints: no  Sleeping adequately:  Yes   Appetite adequate: Yes  Engaged in programming: some    OBJECTIVE:  VITALS:  /75   Pulse 64   Temp 98.4 °F (36.9 °C) (Temporal)   Resp 18   Ht 5' 9\" (1.753 m)   Wt 170 lb (77.1 kg)   SpO2 97%   BMI 25.10 kg/m²     Mental Status Examination:    Appearance: fair grooming and hygiene  Behavior/Attitude toward examiner: improving eye contact  Speech: Normal rate, volume, amount  Mood:  \"ok\"  Affect:  mood congruent    Thought processes:  Goal directed, linear, no ESTEPHANIA or gross disorganization  Thought Content: less SI, no HI, no delusions voiced, no obsessions  Perceptions: no AVH  Attention: attention span and concentration were intact to interview   Abstraction: intact  Cognition:  Alert and oriented to person, place, time, and situation, recall intact  Insight: fair  Judgment: fair    Medication:  Scheduled:   meloxicam  15 mg Oral Daily    escitalopram  10 mg Oral Daily        PRN:  acetaminophen, hydrOXYzine HCl, traZODone, nicotine polacrilex     FORMULATION:  This is a homeless, , and unemployed 50-year-old  with a self-reported history of mood and substance use disorders, who  was brought to our emergency department on a statement of belief by the  [facility]'s deputies after calling 911. He meets criteria for major  depressive disorder, recurrent, severe, without psychotic features. He  also meets criteria for substance use disorders.     Principal Problem:    Severe episode of recurrent major depressive disorder, without psychotic features ([facility] [address]  Active Problems:    Cannabis use disorder, moderate, dependence (HCC)    Methamphetamine use disorder, mild (HCC)    Alcohol use disorder, severe, in sustained remission (HCC)    Cocaine use disorder, severe, in sustained remission (HCC)    Chronic low back pain    Clubbing of fingers  Resolved Problems:    * No resolved hospital problems. *     PLAN:  1. Admitted to Psychiatry for stabilization and treatment. 2.  On admission, ordered Lexparo 5mg to start,  q.15-minute checks for safety, programming, and p.r.n. medication for anxiety, agitation, and insomnia. 12/28/2022 - received 10mg of Lexparo this morning. Missed yesterday's dose. 12/29/2022 - started mobic daily. Tylenol PRN. For low back pain. 12/30/2022- PT/OT requested. 3.  Hospitalist consult for admission. Clubbing on bilateral fingernails and toenails  - hx of smoking  - check chest x-ray     Overgrown toenails  Fungal growth left great toe  - podiatry consulted:  Patient examined. Trimmed nails to decrease thickness to see if that helps decrease his pain. Discussed treatment options for onychomycosis. I would not recommend oral treatments due to potential risk factors. Discussed topical treatments. Patient ultimately states he does not care about the look of the nails. Suspect at least a portion of his issues is secondary to back issues. He can return to spine specialist to see what if anything can be done for his back. Could consider EMG bilateral LE as outpatient for continued neuropathy work up. 4.  admitted on a statement of belief, but he agreed to stay voluntarily. [employer] Monday of continues to make and maintain gains. Total time with patient was 35 minutes and more than 50 % of that time was spent counseling the patient on their symptoms, treatment, and expected goals.      Anat Salas MD

## 2022-12-30 NOTE — PROGRESS NOTES
Department of Psychiatry  Progress Note    Patient's chart was reviewed. Discussed with treatment team. Met with patient. SUBJECTIVE:      Severe lower back and leg pain. 800mg of Ibuprofen helpful. Agreed to a mobic trial with PRN tylenol. Remains severely depressed and anxiety but making some gains. ROS:   Patient has new complaints: no  Sleeping adequately:  Yes   Appetite adequate: Yes  Engaged in programming: some    OBJECTIVE:  VITALS:  /72   Pulse 73   Temp 98.3 °F (36.8 °C) (Oral)   Resp 16   Ht 5' 9\" (1.753 m)   Wt 170 lb (77.1 kg)   SpO2 96%   BMI 25.10 kg/m²     Mental Status Examination:    Appearance: poor grooming and hygiene  Behavior/Attitude toward examiner: fair eye contact  Speech: Normal rate, volume, amount  Mood:  \"down\"  Affect:  mood congruent    Thought processes:  Goal directed, linear, no ESTEPHANIA or gross disorganization  Thought Content: less SI, no HI, no delusions voiced, no obsessions  Perceptions: no AVH  Attention: attention span and concentration were intact to interview   Abstraction: intact  Cognition:  Alert and oriented to person, place, time, and situation, recall intact  Insight: fair  Judgment: fair    Medication:  Scheduled:   meloxicam  15 mg Oral Daily    escitalopram  10 mg Oral Daily        PRN:  acetaminophen, hydrOXYzine HCl, traZODone, nicotine polacrilex     FORMULATION:  This is a homeless, , and unemployed 74-year-old  with a self-reported history of mood and substance use disorders, who  was brought to our emergency department on a statement of belief by the  [facility]'s deputies after calling 911. He meets criteria for major  depressive disorder, recurrent, severe, without psychotic features. He  also meets criteria for substance use disorders.     Principal Problem:    Severe episode of recurrent major depressive disorder, without psychotic features ([address]  Active Problems:    Cannabis use disorder, moderate, dependence ([address] Methamphetamine use disorder, mild (HCC)    Alcohol use disorder, severe, in sustained remission (HCC)    Cocaine use disorder, severe, in sustained remission (HCC)    Chronic low back pain    Clubbing of fingers  Resolved Problems:    * No resolved hospital problems. *     PLAN:  1. Admitted to Psychiatry for stabilization and treatment. 2.  On admission, ordered Lexparo 5mg to start,  q.15-minute checks for safety, programming, and p.r.n. medication for anxiety, agitation, and insomnia. 12/28/2022 - received 10mg of Lexparo this morning. Missed yesterday's dose. 12/29/2022 - start mobic daily. Tylenol PRN. For low back pain. 3.  Hospitalist consult for admission. Clubbing on bilateral fingernails and toenails  - hx of smoking  - check chest x-ray     Overgrown toenails  Fungal growth left great toe  - podiatry consulted:  Patient examined. Trimmed nails to decrease thickness to see if that helps decrease his pain. Discussed treatment options for onychomycosis. I would not recommend oral treatments due to potential risk factors. Discussed topical treatments. Patient ultimately states he does not care about the look of the nails. Suspect at least a portion of his issues is secondary to back issues. He can return to spine specialist to see what if anything can be done for his back. Could consider EMG bilateral LE as outpatient for continued neuropathy work up. Thank you for allowing me to participate in the care of your patient. 4.  admitted on a statement of belief, but he agreed to stay voluntarily. Total time with patient was 35 minutes and more than 50 % of that time was spent counseling the patient on their symptoms, treatment, and expected goals.      Ronn Palomino MD

## 2022-12-30 NOTE — PROGRESS NOTES
Linda Warner was observed rocking back and forth in bed by staff during rounds. He stated his sciatic pain was intense and rated it 9/10 on a 1-10 scale. PRN Tylenol was administered according to the order. Linda Warner also stated that his bed is too soft and asked for cardboard to lay on top of his bed. This writer showed him how to move his bed around to make it more comfortable, but it was in pain and not open to education at that time. This writer suggested that he lay on the floor, and Linda Warner stated that he had just tried that, but it was too hard. This writer offered to bring ice packs, hot packs, and additional blankets, but Linda Warner declined.

## 2022-12-31 PROCEDURE — 6370000000 HC RX 637 (ALT 250 FOR IP): Performed by: PSYCHIATRY & NEUROLOGY

## 2022-12-31 PROCEDURE — 1240000000 HC EMOTIONAL WELLNESS R&B

## 2022-12-31 RX ADMIN — ACETAMINOPHEN 650 MG: 325 TABLET ORAL at 07:53

## 2022-12-31 RX ADMIN — MELOXICAM 15 MG: 15 TABLET ORAL at 07:54

## 2022-12-31 RX ADMIN — HYDROXYZINE HYDROCHLORIDE 50 MG: 50 TABLET ORAL at 07:53

## 2022-12-31 RX ADMIN — HYDROXYZINE HYDROCHLORIDE 50 MG: 50 TABLET ORAL at 20:39

## 2022-12-31 RX ADMIN — TRAZODONE HYDROCHLORIDE 50 MG: 50 TABLET ORAL at 20:39

## 2022-12-31 RX ADMIN — ESCITALOPRAM OXALATE 10 MG: 10 TABLET ORAL at 07:54

## 2022-12-31 ASSESSMENT — PAIN DESCRIPTION - LOCATION
LOCATION: BACK
LOCATION: BACK;LEG

## 2022-12-31 ASSESSMENT — PAIN DESCRIPTION - ONSET: ONSET: ON-GOING

## 2022-12-31 ASSESSMENT — PAIN DESCRIPTION - DESCRIPTORS
DESCRIPTORS: ACHING;CRAMPING
DESCRIPTORS: ACHING

## 2022-12-31 ASSESSMENT — PAIN SCALES - GENERAL
PAINLEVEL_OUTOF10: 7
PAINLEVEL_OUTOF10: 7

## 2022-12-31 ASSESSMENT — PAIN DESCRIPTION - FREQUENCY: FREQUENCY: CONTINUOUS

## 2022-12-31 ASSESSMENT — PAIN DESCRIPTION - PAIN TYPE: TYPE: CHRONIC PAIN

## 2022-12-31 ASSESSMENT — PAIN - FUNCTIONAL ASSESSMENT: PAIN_FUNCTIONAL_ASSESSMENT: ACTIVITIES ARE NOT PREVENTED

## 2022-12-31 NOTE — PLAN OF CARE
Problem: Self Harm/Suicidality  Goal: Will have no self-injury during hospital stay  Description: INTERVENTIONS:  1. Ensure constant observer at bedside with Q15M safety checks  2. Maintain a safe environment  3. Secure patient belongings  4. Ensure family/visitors adhere to safety recommendations  5. Ensure safety tray has been added to patient's diet order  6. Every shift and PRN: Re-assess suicidal risk via Frequent Screener    12/31/2022 0953 by Heber Marti RN  Outcome: Progressing  Flowsheets (Taken 12/31/2022 2313)  Will have no self-injury during hospital stay: Maintain a safe environment     Problem: Pain  Goal: Verbalizes/displays adequate comfort level or baseline comfort level  12/31/2022 0953 by Heber Marti RN  Outcome: Progressing     Problem: Anxiety  Goal: Will report anxiety at manageable levels  Description: INTERVENTIONS:  1. Administer medication as ordered  2. Teach and rehearse alternative coping skills  3. Provide emotional support with 1:1 interaction with staff  12/31/2022 0953 by Heber Marti RN  Outcome: Progressing  Flowsheets (Taken 12/31/2022 1808)  Will report anxiety at manageable levels: Provide emotional support with 1:1 interaction with staff     Problem: Depression/Self Harm  Goal: Effect of psychiatric condition will be minimized and patient will be protected from self harm  Description: INTERVENTIONS:  1. Assess impact of patient's symptoms on level of functioning, self care needs and offer support as indicated  2. Assess patient/family knowledge of depression, impact on illness and need for teaching  3. Provide emotional support, presence and reassurance  4. Assess for possible suicidal thoughts or ideation. If patient expresses suicidal thoughts or statements do not leave alone, initiate Suicide Precautions, move to a room close to the nursing station and obtain sitter  5.  Initiate consults as appropriate with Mental Health Professional, Spiritual Care, Psychosocial CNS, and consider a recommendation to the LIP for a Psychiatric Consultation  12/31/2022 0953 by Bennett Richards, RN  Outcome: Progressing  Flowsheets  Taken 12/31/2022 6214  Effect of psychiatric condition will be minimized and patient will be protected from self harm: Assess for suicidal thoughts or ideation. If patient expresses suicidal thoughts or statements do not leave alone, initiate Suicide Precautions, move near nurse station, obtain sitter     Pt out for meals, c/o intense back pain secondary to soft mattress. Pt medicated w/ tylenol as well as Atarax for anxiety. Reports poor sleep d/t back pain. Pt denies SI,HI or AVH and CFS.

## 2022-12-31 NOTE — PLAN OF CARE
Problem: Self Harm/Suicidality  Goal: Will have no self-injury during hospital stay  Description: INTERVENTIONS:  1. Ensure constant observer at bedside with Q15M safety checks  2. Maintain a safe environment  3. Secure patient belongings  4. Ensure family/visitors adhere to safety recommendations  5. Ensure safety tray has been added to patient's diet order  6. Every shift and PRN: Re-assess suicidal risk via Frequent Screener    12/31/2022 0010 by Jc Kendrick RN  Outcome: Progressing     Problem: Pain  Goal: Verbalizes/displays adequate comfort level or baseline comfort level  12/31/2022 0010 by Jc Kendrick RN  Outcome: Progressing     Problem: Anxiety  Goal: Will report anxiety at manageable levels  Description: INTERVENTIONS:  1. Administer medication as ordered  2. Teach and rehearse alternative coping skills  3. Provide emotional support with 1:1 interaction with staff  Outcome: Progressing     Problem: Depression/Self Harm  Goal: Effect of psychiatric condition will be minimized and patient will be protected from self harm  Description: INTERVENTIONS:  1. Assess impact of patient's symptoms on level of functioning, self care needs and offer support as indicated  2. Assess patient/family knowledge of depression, impact on illness and need for teaching  3. Provide emotional support, presence and reassurance  4. Assess for possible suicidal thoughts or ideation. If patient expresses suicidal thoughts or statements do not leave alone, initiate Suicide Precautions, move to a room close to the nursing station and obtain sitter  5. Initiate consults as appropriate with Mental Health Professional, Spiritual Care, Psychosocial CNS, and consider a recommendation to the LIP for a Psychiatric Consultation  Outcome: not progressing   Pt has been mostly isolative to room. States that he spoke to his ex-wife on the phone. Stated that she wasn't very helpful. Doesn't really want her to call.  Stated words to the effect that she just rubbed in the negatives. No real outside support noted. Overall pt has been less anxious even with the negatives with his ex-wife. Pt has voiced no pain this shift so far. Silvina Conway did not attend Wrap Up Group. No falls and pt does seem to have steady gait.

## 2022-12-31 NOTE — PROGRESS NOTES
Department of Psychiatry  Progress Note    Patient's chart was reviewed. Discussed with treatment team. Met with patient. SUBJECTIVE:      Less depressed and anxious; more active in the milieu/programming. SI resolved. Continues with severe low back pain and sciatica. ROS:   Patient has new complaints: no  Sleeping adequately:  Yes   Appetite adequate: Yes  Engaged in programming: some    OBJECTIVE:  VITALS:  /62   Pulse 68   Temp 98.1 °F (36.7 °C) (Oral)   Resp 17   Ht 5' 9\" (1.753 m)   Wt 170 lb (77.1 kg)   SpO2 98%   BMI 25.10 kg/m²     Mental Status Examination:    Appearance: fair grooming and hygiene  Behavior/Attitude toward examiner: improving eye contact  Speech: Normal rate, volume, amount  Mood:  \"ok\"  Affect:  mood congruent    Thought processes:  Goal directed, linear, no ESTEPHANIA or gross disorganization  Thought Content: less SI, no HI, no delusions voiced, no obsessions  Perceptions: no AVH  Attention: attention span and concentration were intact to interview   Abstraction: intact  Cognition:  Alert and oriented to person, place, time, and situation, recall intact  Insight: fair  Judgment: fair    Medication:  Scheduled:   meloxicam  15 mg Oral Daily    escitalopram  10 mg Oral Daily        PRN:  acetaminophen, hydrOXYzine HCl, traZODone, nicotine polacrilex     FORMULATION:  This is a homeless, , and unemployed 50-year-old  with a self-reported history of mood and substance use disorders, who  was brought to our emergency department on a statement of belief by the  [facility]'s deputies after calling 911. He meets criteria for major  depressive disorder, recurrent, severe, without psychotic features. He  also meets criteria for substance use disorders.     Principal Problem:    Severe episode of recurrent major depressive disorder, without psychotic features ([facility] [address]  Active Problems:    Cannabis use disorder, moderate, dependence (HCC)    Methamphetamine use disorder, mild ([facility] [address]    Alcohol use disorder, severe, in sustained remission ([facility] [address]    Cocaine use disorder, severe, in sustained remission (HCC)    Chronic low back pain    Clubbing of fingers  Resolved Problems:    * No resolved hospital problems. *     PLAN:  1. Admitted to Psychiatry for stabilization and treatment. 2.  On admission, ordered Lexparo 5mg to start,  q.15-minute checks for safety, programming, and p.r.n. medication for anxiety, agitation, and insomnia. 12/28/2022 - received 10mg of Lexparo this morning. Missed yesterday's dose. 12/29/2022 - started mobic daily. Tylenol PRN for low back pain. 12/30/2022- PT/OT requested given disability associated with low back pain. 3.  Hospitalist consult for admission. Clubbing on bilateral fingernails and toenails  - hx of smoking  - check chest x-ray     Overgrown toenails  Fungal growth left great toe  - podiatry consulted:  Patient examined. Trimmed nails to decrease thickness to see if that helps decrease his pain. Discussed treatment options for onychomycosis. I would not recommend oral treatments due to potential risk factors. Discussed topical treatments. Patient ultimately states he does not care about the look of the nails. Suspect at least a portion of his issues is secondary to back issues. He can return to spine specialist to see what if anything can be done for his back. Could consider EMG bilateral LE as outpatient for continued neuropathy work up. 4.  admitted on a statement of belief, but he agreed to stay voluntarily. [employer] Monday if he continues to make and maintain gains. Total time with patient was 35 minutes and more than 50 % of that time was spent counseling the patient on their symptoms, treatment, and expected goals.      Rikki Mack MD

## 2023-01-01 PROCEDURE — 97530 THERAPEUTIC ACTIVITIES: CPT

## 2023-01-01 PROCEDURE — 1240000000 HC EMOTIONAL WELLNESS R&B

## 2023-01-01 PROCEDURE — 97165 OT EVAL LOW COMPLEX 30 MIN: CPT

## 2023-01-01 PROCEDURE — 6370000000 HC RX 637 (ALT 250 FOR IP): Performed by: PSYCHIATRY & NEUROLOGY

## 2023-01-01 PROCEDURE — 97162 PT EVAL MOD COMPLEX 30 MIN: CPT

## 2023-01-01 RX ADMIN — ACETAMINOPHEN 650 MG: 325 TABLET ORAL at 08:11

## 2023-01-01 RX ADMIN — MELOXICAM 15 MG: 15 TABLET ORAL at 08:11

## 2023-01-01 RX ADMIN — ESCITALOPRAM OXALATE 10 MG: 10 TABLET ORAL at 08:11

## 2023-01-01 RX ADMIN — HYDROXYZINE HYDROCHLORIDE 50 MG: 50 TABLET ORAL at 21:20

## 2023-01-01 RX ADMIN — TRAZODONE HYDROCHLORIDE 50 MG: 50 TABLET ORAL at 21:20

## 2023-01-01 ASSESSMENT — PAIN - FUNCTIONAL ASSESSMENT: PAIN_FUNCTIONAL_ASSESSMENT: ACTIVITIES ARE NOT PREVENTED

## 2023-01-01 ASSESSMENT — PAIN SCALES - GENERAL: PAINLEVEL_OUTOF10: 7

## 2023-01-01 ASSESSMENT — PAIN DESCRIPTION - ORIENTATION: ORIENTATION: MID

## 2023-01-01 ASSESSMENT — PAIN DESCRIPTION - DESCRIPTORS: DESCRIPTORS: ACHING

## 2023-01-01 ASSESSMENT — PAIN DESCRIPTION - LOCATION: LOCATION: BACK
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
## 2023-01-01 NOTE — PLAN OF CARE
Very happy about cardboard on bed. Still complains of pain, but refused pain medication. Withdrawn to room; came out for snacks & to watch football for a bit. CFS, denies all. Problem: Self Harm/Suicidality  Goal: Will have no self-injury during hospital stay  Description: INTERVENTIONS:  1. Ensure constant observer at bedside with Q15M safety checks  2. Maintain a safe environment  3. Secure patient belongings  4. Ensure family/visitors adhere to safety recommendations  5. Ensure safety tray has been added to patient's diet order  6. Every shift and PRN: Re-assess suicidal risk via Frequent Screener    Outcome: Progressing     Problem: Anxiety  Goal: Will report anxiety at manageable levels  Description: INTERVENTIONS:  1. Administer medication as ordered  2. Teach and rehearse alternative coping skills  3. Provide emotional support with 1:1 interaction with staff  Outcome: Progressing     Problem: Depression/Self Harm  Goal: Effect of psychiatric condition will be minimized and patient will be protected from self harm  Description: INTERVENTIONS:  1. Assess impact of patient's symptoms on level of functioning, self care needs and offer support as indicated  2. Assess patient/family knowledge of depression, impact on illness and need for teaching  3. Provide emotional support, presence and reassurance  4. Assess for possible suicidal thoughts or ideation. If patient expresses suicidal thoughts or statements do not leave alone, initiate Suicide Precautions, move to a room close to the nursing station and obtain sitter  5. Initiate consults as appropriate with Mental Health Professional, Spiritual Care, Psychosocial CNS, and consider a recommendation to the LIP for a Psychiatric Consultation  Outcome: Progressing     Problem: Depression  Goal: Will be euthymic at discharge  Description: INTERVENTIONS:  1. Administer medication as ordered  2. Provide emotional support via 1:1 interaction with staff  3. Encourage involvement in milieu/groups/activities  4.  Monitor for social isolation  Outcome: Progressing     Problem: Pain  Goal: Verbalizes/displays adequate comfort level or baseline comfort level  Outcome: Not Progressing
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
## 2023-01-01 NOTE — PLAN OF CARE
Problem: Pain  Goal: Verbalizes/displays adequate comfort level or baseline comfort level  1/1/2023 1008 by Iline Ormond, RN  Outcome: Not Progressing  1/1/2023 0311 by Анна Castellano RN  Outcome: Not Progressing     Problem: Anxiety  Goal: Will report anxiety at manageable levels  Description: INTERVENTIONS:  1. Administer medication as ordered  2. Teach and rehearse alternative coping skills  3. Provide emotional support with 1:1 interaction with staff  1/1/2023 1008 by Iline Ormond, RN  Outcome: Not Progressing  1/1/2023 0311 by Анна Castellano RN  Outcome: Progressing   Denies suicidal ideation. Remains isolative to room. Cooperative with care.

## 2023-01-02 PROCEDURE — 1240000000 HC EMOTIONAL WELLNESS R&B

## 2023-01-02 PROCEDURE — 6370000000 HC RX 637 (ALT 250 FOR IP): Performed by: PSYCHIATRY & NEUROLOGY

## 2023-01-02 RX ADMIN — ESCITALOPRAM OXALATE 10 MG: 10 TABLET ORAL at 08:14

## 2023-01-02 RX ADMIN — MELOXICAM 15 MG: 15 TABLET ORAL at 08:05

## 2023-01-02 RX ADMIN — TRAZODONE HYDROCHLORIDE 50 MG: 50 TABLET ORAL at 20:47

## 2023-01-02 RX ADMIN — ACETAMINOPHEN 650 MG: 325 TABLET ORAL at 08:05

## 2023-01-02 RX ADMIN — HYDROXYZINE HYDROCHLORIDE 50 MG: 50 TABLET ORAL at 20:47

## 2023-01-02 ASSESSMENT — PAIN DESCRIPTION - LOCATION
LOCATION: BACK
LOCATION: BACK

## 2023-01-02 ASSESSMENT — PAIN DESCRIPTION - ONSET
ONSET: ON-GOING
ONSET: ON-GOING

## 2023-01-02 ASSESSMENT — PAIN DESCRIPTION - DESCRIPTORS
DESCRIPTORS: ACHING
DESCRIPTORS: ACHING

## 2023-01-02 ASSESSMENT — PAIN - FUNCTIONAL ASSESSMENT
PAIN_FUNCTIONAL_ASSESSMENT: ACTIVITIES ARE NOT PREVENTED
PAIN_FUNCTIONAL_ASSESSMENT: ACTIVITIES ARE NOT PREVENTED

## 2023-01-02 ASSESSMENT — PAIN DESCRIPTION - FREQUENCY
FREQUENCY: CONTINUOUS
FREQUENCY: CONTINUOUS

## 2023-01-02 ASSESSMENT — PAIN DESCRIPTION - PAIN TYPE
TYPE: CHRONIC PAIN
TYPE: CHRONIC PAIN

## 2023-01-02 ASSESSMENT — PAIN DESCRIPTION - ORIENTATION
ORIENTATION: LOWER;LEFT
ORIENTATION: LOWER;LEFT

## 2023-01-02 ASSESSMENT — PAIN SCALES - GENERAL
PAINLEVEL_OUTOF10: 5
PAINLEVEL_OUTOF10: 1

## 2023-01-02 NOTE — GROUP NOTE
Group Therapy Note    Date: 1/2/2023    Group Start Time: 1000  Group End Time: 0306  Group Topic: Psychoeducation    [facility]        Group Therapy Note    Topic: Letting Go and Coping with The Past    Group members explored 10 research supported skills to combat ruminations through mindfulness and behavioral modification. Attendees: 9       Notes: This pt was present and attentive across group discussions, receptive to information relayed and information provided. No needs verbalized at conclusion of session. Will continue encouraging group attendance as appropriate. Status After Intervention:  Improved    Participation Level:  Active Listener and Interactive    Participation Quality: Appropriate and Attentive      Speech:  normal      Thought Process/Content: Logical      Affective Functioning: Congruent      Mood: euthymic      Level of consciousness:  Alert and Attentive      Response to Learning: Able to verbalize/acknowledge new learning and Progressing to goal      Endings: None Reported    Modes of Intervention: Education, Socialization, Exploration, and Problem-solving      Discipline Responsible: Psychoeducational Specialist      Signature:  Luis F Mckoy MM, MT-BC

## 2023-01-02 NOTE — PROGRESS NOTES
Patient complaining of back pain. Rates his pain a 5 on a 1-10 scale. Tylenol given per order.   See [facility]

## 2023-01-02 NOTE — PROGRESS NOTES
Department of Psychiatry  AttendingProgress Note  Chief Complaint: Severe episode of recurrent major depressive disorder, without psychotic features     Patient's chart was reviewed and collaborated with  about the treatment plan. SUBJECTIVE:    Up in groups today. Able to sleep better with cardboard on his bed. Pt nervous about discharge, his car and belongings. Pt would like help with housing. Pt states he was once with [employer], hoping to get back there. Pt lives in his car, but wants something more stable. States he still feels depressed, anxious, but improving. Patient is feeling better. Suicidal ideation:  denies suicidal ideation. Patient does not have medication side effects. ROS: Patient has new complaints: no  Sleeping adequately:  Yes   Appetite adequate: Yes  Attending groups: Yes  Visitors:No    OBJECTIVE    Physical  VITALS:  /65   Pulse 75   Temp 98.4 °F (36.9 °C) (Temporal)   Resp 16   Ht 5' 9\" (1.753 m)   Wt 170 lb (77.1 kg)   SpO2 96%   BMI 25.10 kg/m²     Mental Status Examination:  Patients appearance was well-appearing, street clothes, and lying in bed. Thoughts are Logical. Homicidal ideations none. No abnormal movements, tics or mannerisms. Memory intact Aims 0. Concentration Fair. Alert and oriented X 4. Insight and Judgement normal insight and judgment. Patient was cooperative.  Patient gait normal. Mood decreased range, affect flat affect Hallucinations Absent, suicidal ideations no specific plan to harm self Speech normal pitch and normal volume    Data  Labs:   Admission on 12/26/2022   Component Date Value Ref Range Status    WBC 12/26/2022 9.1  4.0 - 11.0 K/uL Final    RBC 12/26/2022 4.83  4.20 - 5.90 M/uL Final    Hemoglobin 12/26/2022 14.4  13.5 - 17.5 g/dL Final    Hematocrit 12/26/2022 43.7  40.5 - 52.5 % Final    MCV 12/26/2022 90.5  80.0 - 100.0 fL Final    MCH 12/26/2022 29.8  26.0 - 34.0 pg Final    MCHC 12/26/2022 32.9  31.0 - 36.0 g/dL Final    RDW 12/26/2022 13.6  12.4 - 15.4 % Final    Platelets 88/87/9829 250  135 - 450 K/uL Final    MPV 12/26/2022 7.1  5.0 - 10.5 fL Final    Neutrophils % 12/26/2022 67.4  % Final    Lymphocytes % 12/26/2022 23.8  % Final    Monocytes % 12/26/2022 7.1  % Final    Eosinophils % 12/26/2022 1.2  % Final    Basophils % 12/26/2022 0.5  % Final    Neutrophils Absolute 12/26/2022 6.1  1.7 - 7.7 K/uL Final    Lymphocytes Absolute 12/26/2022 2.2  1.0 - 5.1 K/uL Final    Monocytes Absolute 12/26/2022 0.6  0.0 - 1.3 K/uL Final    Eosinophils Absolute 12/26/2022 0.1  0.0 - 0.6 K/uL Final    Basophils Absolute 12/26/2022 0.0  0.0 - 0.2 K/uL Final    Sodium 12/26/2022 138  136 - 145 mmol/L Final    Potassium reflex Magnesium 12/26/2022 3.9  3.5 - 5.1 mmol/L Final    Chloride 12/26/2022 102  99 - 110 mmol/L Final    CO2 12/26/2022 27  21 - 32 mmol/L Final    Anion Gap 12/26/2022 9  3 - 16 Final    Glucose 12/26/2022 124 (A)  70 - 99 mg/dL Final    BUN 12/26/2022 22 (A)  7 - 20 mg/dL Final    Creatinine 12/26/2022 1.3  0.9 - 1.3 mg/dL Final    Est, Glom Filt Rate 12/26/2022 >60  >60 Final    Comment: Pediatric calculator link  [email or web address]_[email or web address]  Effective Oct 3, 2022  These results are not intended for use in patients  <25years of age. eGFR results are calculated without  a race factor using the 2021 CKD-EPI equation. Careful  clinical correlation is recommended, particularly when  comparing to results calculated using previous equations. The CKD-EPI equation is less accurate in patients with  extremes of muscle mass, extra-renal metabolism of  creatinine, excessive creatinine ingestion, or following  therapy that affects renal tubular secretion.       Calcium 12/26/2022 9.3  8.3 - 10.6 mg/dL Final    Total Protein 12/26/2022 7.4  6.4 - 8.2 g/dL Final    Albumin 12/26/2022 4.2  3.4 - 5.0 g/dL Final    Albumin/Globulin Ratio 12/26/2022 1.3  1.1 - 2.2 Final    Total Bilirubin 12/26/2022 <0.2  0.0 - 1.0 mg/dL Final    Alkaline Phosphatase 12/26/2022 127  40 - 129 U/L Final    ALT 12/26/2022 21  10 - 40 U/L Final    AST 12/26/2022 20  15 - 37 U/L Final    Amphetamine Screen, Urine 12/26/2022 POSITIVE (A)  Negative <1000ng/mL Final    Comment: High concentrations of ephedrine/pseudoephedrine or  phenylpropanolamine may cause false positive results  for amphetamine. Therefore, confirmatory testing for  amphetamine should be considered if clinically indicated. Barbiturate Screen, Ur 12/26/2022 Neg  Negative <200 ng/mL Final    Benzodiazepine Screen, Urine 12/26/2022 Neg  Negative <200 ng/mL Final    Cannabinoid Scrn, Ur 12/26/2022 POSITIVE (A)  Negative <50 ng/mL Final    Cocaine Metabolite Screen, Urine 12/26/2022 Neg  Negative <300 ng/mL Final    Opiate Scrn, Ur 12/26/2022 Neg  Negative <300 ng/mL Final    Comment: \"Therapeutic levels of pain medication, especially oxycontin and synthetic  opioids, may not be detected by this Methodology. Pain management screen  panel  Drug panel-PM-Hi Res Ur, Interp (PAIN) should be considered for drug  monitoring \". PCP Screen, Urine 12/26/2022 Neg  Negative <25 ng/mL Final    Methadone Screen, Urine 12/26/2022 Neg  Negative <300 ng/mL Final    Oxycodone Urine 12/26/2022 Neg  Negative <100 ng/ml Final    FENTANYL SCREEN, URINE 12/26/2022 Neg  Negative <5 ng/mL Final    pH, UA 12/26/2022 7.0   Final    Comment: Urine pH less than 5.0 or greater than 8.0 may indicate sample adulteration. Another sample should be collected if clinically  indicated. Drug Screen Comment: 12/26/2022 see below   Final    Comment: This method is a screening test to detect only these drug  classes as part of a medical workup. Confirmatory testing  by another method should be ordered if clinically indicated.       Ethanol Lvl 12/26/2022 11  mg/dL Final    Comment:    None Detected  Conversion factor:  100 mg/dl = .100 g/dl  For Medical Purposes Only Salicylate, Serum  <0.3 (A)  15.0 - 30.0 mg/dL Final    Comment: Therapeutic Range: 15.0-30.0 mg/dL  Toxic: >30.0 mg/dL      Acetaminophen Level 2022 <5 (A)  10 - 30 ug/mL Final    Comment: Therapeutic Range: 10.0-30.0 ug/mL  Toxic: >=150 ug/mL      SARS-CoV-2 RNA, RT PCR 2022 NOT DETECTED  NOT DETECTED Final    Comment: Not Detected results do not preclude SARS-CoV-2 infection and  should not be used as the sole basis for patient management  decisions. Results must be combined with clinical observations,  patient history, and epidemiological information. Testing was performed using NANCY CLEMENTINA SARS-CoV-2 and Influenza A/B  nucleic acid assay. This test is a multiplex Real-Time Reverse  Transcriptase Polymerase Chain Reaction (RT-PCR)-based in vitro  diagnostic test intended for the qualitative detection of nucleic  acids from SARS-CoV-2, influenza A, and influenza B in nasopharyngeal  and nasal swab specimens for use under the FDAs Emergency Use  Authorization (EUA) only.     Patient Fact Sheet:  FindDrives.pl  Provider Fact Sheet: FindDrives.pl  EUA: FindDrives.pl  IFU: FindDrives.pl    Methodology:  RT-PCR      INFLUENZA A 2022 NOT DETECTED  NOT DETECTED Final    INFLUENZA B 2022 NOT DETECTED  NOT DETECTED Final            Medications  Current Facility-Administered Medications: magnesium hydroxide (MILK OF MAGNESIA) 400 MG/5ML suspension 30 mL, 30 mL, Oral, Daily PRN  meloxicam (MOBIC) tablet 15 mg, 15 mg, Oral, Daily  acetaminophen (TYLENOL) tablet 650 mg, 650 mg, Oral, Q6H PRN  hydrOXYzine HCl (ATARAX) tablet 50 mg, 50 mg, Oral, TID PRN  traZODone (DESYREL) tablet 50 mg, 50 mg, Oral, Nightly PRN  nicotine polacrilex (COMMIT) lozenge 2 mg, 2 mg, Oral, Q1H PRN  [] escitalopram (LEXAPRO) tablet 5 mg, 5 mg, Oral, Daily **FOLLOWED BY** escitalopram (LEXAPRO) tablet 10 mg, 10 mg, Oral, Daily    ASSESSMENT AND PLAN    Principal Problem:    Severe episode of recurrent major depressive disorder, without psychotic features ([facility] [address]  Active Problems:    Cannabis use disorder, moderate, dependence (HCC)    Methamphetamine use disorder, mild (HCC)    Alcohol use disorder, severe, in sustained remission (HCC)    Cocaine use disorder, severe, in sustained remission (HCC)    Chronic low back pain    Clubbing of fingers  Resolved Problems:    * No resolved hospital problems. *       1. Patient's symptoms   are improving  2. Probable discharge is tomorrow  3. Discharge planning is incomplete  4. Suicidal ideation is better  5. Total time with patient was 40 minutes and more than 50 % of that time was spent counseling the patient on their symptoms, treatment and expected goals.      Nellie Severance, PMLACIP-BC

## 2023-01-02 NOTE — PLAN OF CARE
Problem: Self Harm/Suicidality  Goal: Will have no self-injury during hospital stay  Description: INTERVENTIONS:  1. Ensure constant observer at bedside with Q15M safety checks  2. Maintain a safe environment  3. Secure patient belongings  4. Ensure family/visitors adhere to safety recommendations  5. Ensure safety tray has been added to patient's diet order  6. Every shift and PRN: Re-assess suicidal risk via Frequent Screener    Outcome: Progressing  Flowsheets (Taken 1/2/2023 0921)  Will have no self-injury during hospital stay: Maintain a safe environment     Problem: Pain  Goal: Verbalizes/displays adequate comfort level or baseline comfort level  Outcome: Progressing     Problem: Anxiety  Goal: Will report anxiety at manageable levels  Description: INTERVENTIONS:  1. Administer medication as ordered  2. Teach and rehearse alternative coping skills  3. Provide emotional support with 1:1 interaction with staff  Outcome: Progressing  Flowsheets (Taken 1/2/2023 0921)  Will report anxiety at manageable levels: Provide emotional support with 1:1 interaction with staff     Problem: Confusion  Goal: Confusion, delirium, dementia, or psychosis is improved or at baseline  Description: INTERVENTIONS:  1. Assess for possible contributors to thought disturbance, including medications, impaired vision or hearing, underlying metabolic abnormalities, dehydration, psychiatric diagnoses, and notify attending LIP  2. Allen high risk fall precautions, as indicated  3. Provide frequent short contacts to provide reality reorientation, refocusing and direction  4. Decrease environmental stimuli, including noise as appropriate  5. Monitor and intervene to maintain adequate nutrition, hydration, elimination, sleep and activity  6. If unable to ensure safety without constant attention obtain sitter and review sitter guidelines with assigned personnel  7.  Initiate Psychosocial CNS and Spiritual Care consult, as indicated  Outcome: Progressing     Problem: Depression/Self Harm  Goal: Effect of psychiatric condition will be minimized and patient will be protected from self harm  Description: INTERVENTIONS:  1. Assess impact of patient's symptoms on level of functioning, self care needs and offer support as indicated  2. Assess patient/family knowledge of depression, impact on illness and need for teaching  3. Provide emotional support, presence and reassurance  4. Assess for possible suicidal thoughts or ideation. If patient expresses suicidal thoughts or statements do not leave alone, initiate Suicide Precautions, move to a room close to the nursing station and obtain sitter  5. Initiate consults as appropriate with Mental Health Professional, Spiritual Care, Psychosocial CNS, and consider a recommendation to the LIP for a Psychiatric Consultation  Outcome: Progressing  Flowsheets  Taken 1/2/2023 0925  Effect of psychiatric condition will be minimized and patient will be protected from self harm: Provide emotional support, presence and reassurance  Taken 1/2/2023 0921  Effect of psychiatric condition will be minimized and patient will be protected from self harm: Provide emotional support, presence and reassurance     Problem: Drug Abuse/Detox  Goal: Will have no detox symptoms and will verbalize plan for changing drug-related behavior  Description: INTERVENTIONS:  1. Administer medication as ordered  2. Monitor physical status  3. Provide emotional support with 1:1 interaction with staff  4. Encourage  recovery focused treatment   Outcome: Progressing  Flowsheets (Taken 1/2/2023 0921)  Will have no detox symptoms and will verbalize plan for changing drug-related behavior: Administer medication as ordered     Problem: Depression  Goal: Will be euthymic at discharge  Description: INTERVENTIONS:  1. Administer medication as ordered  2. Provide emotional support via 1:1 interaction with staff  3.  Encourage involvement in milieu/groups/activities  4. Monitor for social isolation  Outcome: Progressing     Problem: Mony  Goal: Will exhibit normal sleep and speech and no impulsivity  Description: INTERVENTIONS:  1. Administer medication as ordered  2. Set limits on impulsive behavior  3. Make attempts to decrease external stimuli as possible  Outcome: Progressing     Problem: Psychosis  Goal: Will report no hallucinations or delusions  Description: INTERVENTIONS:  1. Administer medication as  ordered  2. Assist with reality testing to support increasing orientation  3. Assess if patient's hallucinations or delusions are encouraging self harm or harm to others and intervene as appropriate  Outcome: Progressing     Problem: Behavior  Goal: Pt/Family maintain appropriate behavior and adhere to behavioral management agreement, if implemented  Description: INTERVENTIONS:  1. Assess patient/family's coping skills and  non-compliant behavior (including use of illegal substances)  2. Notify security of behavior or suspected illegal substances which indicate the need for search of the family and/or belongings  3. Encourage verbalization of thoughts and concerns in a socially appropriate manner  4. Utilize positive, consistent limit setting strategies supporting safety of patient, staff and others  5. Encourage participation in the decision making process about the behavioral management agreement  6. If a visitor's behavior poses a threat to safety call refer to organization policy.   7. Initiate consult with , Psychosocial CNS, Spiritual Care as appropriate  Outcome: Progressing  Flowsheets (Taken 1/2/2023 7291)  Patient/family maintains appropriate behavior and adheres to behavioral management agreement, if implemented:   Assess patient/familys coping skills and  non-compliant behavior (including use of illegal substances)   Encourage participation in the decision making process about the behavioral management agreement Problem: Sleep Disturbance  Goal: Will exhibit normal sleeping pattern  Description: INTERVENTIONS:  1. Administer medication as ordered  2. Decrease environmental stimuli, including noise, as appropriate  3. Discourage social isolation and naps during the day  Outcome: Progressing     Problem: Self Care Deficit  Goal: Return ADL status to a safe level of function  Description: INTERVENTIONS:  1. Administer medication as ordered  2. Assess ADL deficits and provide assistive devices as needed  3. Obtain PT/OT consults as needed  4. Assist and instruct patient to increase activity and self care as tolerated  Outcome: Progressing    Patient was visible on unit, social with select peers but mostly isolated  to self. He was medication complaint. Attended and participated in groups. Patient was cooperative but tearful during interview. Denies SI/HI/AVH. Patient sleep pattern has improved since admission. He continues  to have chronic back pain and has utilized PRN medication which was somewhat effective. He has some increased anxiety he is worried about his belongings which are in his care. Patient states he has been living out of his car for the last several months and he left the keys with a friend in case it needed to be moved. He was future forward thinking and stated he would like to be able to find housing. He stated he had worked part time in the past but due to his chronic back pain it has been hard for him to keep a job. He does not have any family support, was living in his daughters garage but is unable to get along with her at this time and returned to living out of his car. He has been able to verbalize his needs to staff and has been cooperative with all care.

## 2023-01-02 NOTE — PROGRESS NOTES
Behavioral Services                                              Medicare Re-Certification    I certify that the inpatient psychiatric hospital services furnished since the previous certification/re-certification were, and continue to be, medically necessary for;    [x] (1) Treatment which could reasonably be expected to improve the patient's condition,    [x] (2) Or for diagnostic study. Estimated length of stay/service 2-5 days    Plan for post-hospital care outpatient services      This patient continues to need, on a daily basis, active treatment furnished directly by or requiring the supervision of inpatient psychiatric personnel.     Electronically signed by CHON Blair CNP on 1/2/2023 at 11:17 AM

## 2023-01-02 NOTE — PLAN OF CARE
Problem: Pain  Goal: Verbalizes/displays adequate comfort level or baseline comfort level  1/1/2023 1008 by Desiree Means RN  Outcome: Not Progressing   +Patient alert and oriented x 3. Patient rates Depression 5/10 and Anxiety 5/10. Patient denies SI/HI/A/V/H. Patient seclusive to his bed and room this evening. Patient doesn't have HS medications scheduled. Patient medicated with Hydroxyzine 50 mg po for anxiety and Trazodone 50 mg po for sleep. Patient ate a HS snack. No c/o's voiced @ present. Problem: Anxiety  Goal: Will report anxiety at manageable levels  Description: INTERVENTIONS:  1. Administer medication as ordered  2. Teach and rehearse alternative coping skills  3.  Provide emotional support with 1:1 interaction with staff  1/1/2023 1008 by Desiree Means RN  Outcome: Not Progressing

## 2023-01-02 NOTE — PLAN OF CARE
Patient alert and oriented x 3. Patient rates Depression 0/10 and Anxiety 2/10. Patient visible and social on the milieu. Patient denies SI/HI/A/V/H. Patient took HS medications. Patient denies self harm. No c/o's voiced @ present. Problem: Pain  Goal: Verbalizes/displays adequate comfort level or baseline comfort level  1/1/2023 1008 by Thais Closs, RN  Outcome: Not Progressing     Problem: Anxiety  Goal: Will report anxiety at manageable levels  Description: INTERVENTIONS:  1. Administer medication as ordered  2. Teach and rehearse alternative coping skills  3.  Provide emotional support with 1:1 interaction with staff  1/1/2023 1008 by Thais Closs, RN  Outcome: Not Progressing

## 2023-01-02 NOTE — PROGRESS NOTES
Reassessment of PRN Tylenol. Patient states his pain is now a 1 on a 1-10 scale. States it is always there and never goes away. Medication effective.

## 2023-01-02 NOTE — GROUP NOTE
Group Therapy Note    Date: 1/2/2023    Group Start Time: 1100  Group End Time: 4619  Group Topic: Psychoeducation    [address] [place] [address]        Group Therapy Note    Attendees: 7      Facilitator led a group discussion challenging negative thoughts and replacing them with positive, motivational thoughts instead. Group discussed the power of Mantra's and positive self-talk. Facilitator provided group members with instructions on how to formulate a personal Mantra, along paper and markers. Group members were instructed to create their own [employer]. Group finished with members sharing their mantra's and the group processed when Mantra's can be used. Patient's Goal:  Participate in group discussion regarding positive self-talk and develop personal mantra. Notes:  Patient was engaged in discussion and in activity. Patient was able to create a Mantra and was engaged in creative activity. Status After Intervention:  Improved    Participation Level:  Active Listener    Participation Quality: Appropriate, Attentive, and Supportive      Speech:  normal      Thought Process/Content: Logical  Linear      Affective Functioning: Congruent      Mood: euthymic      Level of consciousness:  Alert, Oriented x4, and Attentive      Response to Learning: Able to verbalize current knowledge/experience, Able to verbalize/acknowledge new learning, Able to retain information, Capable of insight, Able to change behavior, and Progressing to goal      Endings: None Reported    Modes of Intervention: Education, Socialization, and Activity      Discipline Responsible: Behavorial Health Tech      Signature:  Mohamud Escalante

## 2023-01-02 NOTE — FLOWSHEET NOTE
Purposeful Rounding    Patient Location: Day room    Patient willing to engage in conversation: Yes    Presentation/behavior: Anxious    Affect: Flat/blunted    Concerns reported: complaining of back pain     PRN medications given: Tylenol will be given with scheduled medications    Environmental assessment: Room free from clutter, Clear path to bathroom , and Adequate lighting    Fall prevention interventions in place: Yellow non-skid socks on    Daily Cardona Fall Risk Score: 0-low      Electronically signed by Yasmany Freitas RN on 1/2/23 at 8:01 AM EST

## 2023-01-03 VITALS
SYSTOLIC BLOOD PRESSURE: 118 MMHG | OXYGEN SATURATION: 97 % | HEART RATE: 71 BPM | TEMPERATURE: 98.9 F | DIASTOLIC BLOOD PRESSURE: 76 MMHG | WEIGHT: 170 LBS | BODY MASS INDEX: 25.18 KG/M2 | RESPIRATION RATE: 18 BRPM | HEIGHT: 69 IN

## 2023-01-03 PROCEDURE — 5130000000 HC BRIDGE APPOINTMENT

## 2023-01-03 PROCEDURE — 6370000000 HC RX 637 (ALT 250 FOR IP): Performed by: PSYCHIATRY & NEUROLOGY

## 2023-01-03 RX ORDER — ESCITALOPRAM OXALATE 10 MG/1
10 TABLET ORAL DAILY
Qty: 30 TABLET | Refills: 0 | Status: SHIPPED | OUTPATIENT
Start: 2023-01-03

## 2023-01-03 RX ADMIN — ACETAMINOPHEN 650 MG: 325 TABLET ORAL at 14:07

## 2023-01-03 RX ADMIN — ESCITALOPRAM OXALATE 10 MG: 10 TABLET ORAL at 09:42

## 2023-01-03 RX ADMIN — MELOXICAM 15 MG: 15 TABLET ORAL at 09:36

## 2023-01-03 ASSESSMENT — PAIN SCALES - GENERAL
PAINLEVEL_OUTOF10: 0
PAINLEVEL_OUTOF10: 3
PAINLEVEL_OUTOF10: 5

## 2023-01-03 ASSESSMENT — PAIN SCALES - WONG BAKER: WONGBAKER_NUMERICALRESPONSE: 2

## 2023-01-03 NOTE — GROUP NOTE
Group Therapy Note    Date: 1/3/2023    Group Start Time: 1300  Group End Time: 1400  Group Topic: Psychoeducation    [address] [place] [address]        Group Therapy Note    Responding to Automatic Negative Thoughts    Group members processed types of automatic negative thoughts and cognitive distortions, discussed differences between reaction vs response, ways to practice more mindful engagement with thoughts. Attendees: 8       Notes: This pt was present and attentive across group process. Collaborative with peers during discussions, verbalized understanding of information relayed and resources provided. No needs verbalized at conclusion of session. Will continue encouraging group participation as appropriate. Status After Intervention:  Improved    Participation Level:  Active Listener and Interactive    Participation Quality: Appropriate and Attentive      Speech:  normal      Thought Process/Content: Logical      Affective Functioning: Congruent      Mood: euthymic      Level of consciousness:  Alert and Attentive      Response to Learning: Able to verbalize current knowledge/experience, Able to verbalize/acknowledge new learning, and Progressing to goal      Endings: None Reported    Modes of Intervention: Education, Socialization, Exploration, and Problem-solving      Discipline Responsible: Psychoeducational Specialist      Signature:  Alicia Garrison MM, KEE

## 2023-01-03 NOTE — PLAN OF CARE
Patient was out on the unit early in the shift, with minimal socialization with peers. Patient was pleasant & verbal in 1;1. Patient denied feelings of self harm. Patient also reported not having hallucinations, with no delusions or paranoia noted. Patient stated feeling 50% improved since admission. \"I don't feel as down. \" Patient appeared asleep at 22:15, after receiving atarax & trazodone at 20:47.  Mike Mejia R.N.

## 2023-01-03 NOTE — PROGRESS NOTES
[facility]  Discharge Note    Pt discharged with followings belongings:   Dental Appliances: None  Vision - Corrective Lenses: Eyeglasses (for reading)  Hearing Aid: None  Jewelry: None  Body Piercings Removed: N/A  Clothing: Gloves, Jacket/Coat, Footwear, Socks, Pants, Shirt  Other Valuables: Wallet, Cigarettes, Lighter/Matches, Personal Toiletries   Valuables sent home with patient. Patient educated on aftercare instructions: yes  Information faxed to NA by NA  at 5:41 PM .Patient verbalize understanding of AVS:  yes. Status EXAM upon discharge:  Mental Status and Behavioral Exam  Normal: No  Level of Assistance: Independent/Self  Facial Expression: Flat  Affect: Blunt  Level of Consciousness: Alert  Frequency of Checks: 4 times per hour, close  Mood:Normal: No  Mood: Depressed  Motor Activity:Normal: Yes  Motor Activity: Other (comment) (wnl)  Eye Contact: Good  Observed Behavior: Cooperative  Sexual Misconduct History: Current - no  Preception: [place] to person, [place] to place, [place] to situation, [place] to time  Attention:Normal: Yes  Attention:  (WNL)  Thought Processes: Circumstantial  Thought Content:Normal: Yes  Thought Content:  (wnl)  Depression Symptoms: Isolative, Loss of interest, Change in energy level (Patient reported feeling 50% improved, since admission)  Anxiety Symptoms: Generalized  Mony Symptoms: No problems reported or observed. Hallucinations: None  Delusions: No  Delusions:  Other (comment) (None)  Memory:Normal: No  Memory: Poor remote  Insight and Judgment: No  Insight and Judgment: Poor judgment    Tobacco Screening:  Practical Counseling, on admission, ilya X, if applicable and completed (first 3 are required if patient doesn't refuse):            ( ) Recognizing danger situations (included triggers and roadblocks)                    ( ) Coping skills (new ways to manage stress,relaxation techniques, changing routine, distraction) ( ) Basic information about quitting (benefits of quitting, techniques in how to quit, available resources  ( ) Referral for counseling faxed to [facility]                                                                                                                   (X) Patient refused counseling  ( ) Patient refused referral  ( ) Patient refused prescription upon discharge  ( ) Patient has not smoked in the last 30 days    Metabolic Screening:    No results found for: LABA1C    No results found for: CHOL  No results found for: TRIG  No results found for: HDL  No components found for: LDLCAL  No results found for: LABVLDL    Sloan Baxter, RN

## 2023-01-03 NOTE — GROUP NOTE
Group Therapy Note    Date: 1/3/2023    Group Start Time: 1100  Group End Time: 2576  Group Topic: Psychoeducation    [facility]    RENETTA Marie        Group Therapy Note  Clinician introduced the Zones of Regulation and recognizing feelings. Clinician had the group members work as a group to name as many feeling words and listed them in the correct zones. As a group, there were a total of 16 feeling words names. Clinician passed out an emoji feelings sheet with many emojies and feelings. They also received a handout with the feeling wheel to see how many feelings there are. The group members were able to discuss naming the correct feeling they are experiencing to make sure they are using an congruent coping skill. Attendees: 8       Patient's Goal:      Notes:  Patient was cooperative and fully engaged in the activities and discussion. He participated in naming feelings and putting them in zones. He kept the feelings wheel and emoji handout to work on after group. Status After Intervention:  Improved    Participation Level:  Active Listener and Interactive    Participation Quality: Appropriate, Attentive, Sharing, and Supportive      Speech:  normal      Thought Process/Content: Linear      Affective Functioning: Congruent      Mood: depressed      Level of consciousness:  Attentive      Response to Learning: Able to retain information      Endings: None Reported    Modes of Intervention: Education, Support, Exploration, and Activity      Discipline Responsible: /Counselor      Signature:  RENETTA Marie

## 2023-01-03 NOTE — DISCHARGE SUMMARY
Discharge Summary     Admit Date: 12/26/2022   Discharge Date:    1/3/2023    Final Dx: Severe episode of recurrent major depressive disorder, without psychotic features ([address]     At Discharge: 61-70 mild symptoms   All conditions and active problems were treated while patient was hospitalized. Condition on DC  Mood and affect are stable and pt is not suicidal     VITALS:  /76   Pulse 71   Temp 98.9 °F (37.2 °C) (Temporal)   Resp 18   Ht 5' 9\" (1.753 m)   Wt 170 lb (77.1 kg)   SpO2 97%   BMI 25.10 kg/m²     Brief Summary Present Illness    The patient describes worsening symptoms of depression. He reports worsening low mood, anhedonia, trouble concentrating, trouble sleeping, decreased appetite, tearfulness, self-reproach, feelings of excessive guilt and increasing thoughts of  suicide. This has been made worse by homelessness, limited social support, financial stress, and the death of his cat. Yesterday, he called the suicide hotline and began walking to the  emergency department. After about four miles, he called 911 and was brought in by . Pt was treated and stabilized on BHI. Pt was started on Lexapro, began to show improvements in moods. Pt became more social, attending groups. He was given a piece of cardboard to place on his bed for his chronic back pain, able to sleep better overall after. Pt states he was hoping to discharge to a shelter, interested in [facility], where he had previously been in 2010. Pt needed a  for placement there, social work to refer. Pt states he is feeling better overall, denies all SI/HI and AVH. Pt will discharge to his car, given information on local shelters until he can connect with a  and [facility] Hospital Course Patient stabilized on meds and milieu treatment. Patient was discharged to home to continue recovery in the community.      PE: (reviewed) and labs (see medical H&PE)  Labs: Admission on 12/26/2022   Component Date Value Ref Range Status    WBC 12/26/2022 9.1  4.0 - 11.0 K/uL Final    RBC 12/26/2022 4.83  4.20 - 5.90 M/uL Final    Hemoglobin 12/26/2022 14.4  13.5 - 17.5 g/dL Final    Hematocrit 12/26/2022 43.7  40.5 - 52.5 % Final    MCV 12/26/2022 90.5  80.0 - 100.0 fL Final    MCH 12/26/2022 29.8  26.0 - 34.0 pg Final    MCHC 12/26/2022 32.9  31.0 - 36.0 g/dL Final    RDW 12/26/2022 13.6  12.4 - 15.4 % Final    Platelets 32/95/0825 250  135 - 450 K/uL Final    MPV 12/26/2022 7.1  5.0 - 10.5 fL Final    Neutrophils % 12/26/2022 67.4  % Final    Lymphocytes % 12/26/2022 23.8  % Final    Monocytes % 12/26/2022 7.1  % Final    Eosinophils % 12/26/2022 1.2  % Final    Basophils % 12/26/2022 0.5  % Final    Neutrophils Absolute 12/26/2022 6.1  1.7 - 7.7 K/uL Final    Lymphocytes Absolute 12/26/2022 2.2  1.0 - 5.1 K/uL Final    Monocytes Absolute 12/26/2022 0.6  0.0 - 1.3 K/uL Final    Eosinophils Absolute 12/26/2022 0.1  0.0 - 0.6 K/uL Final    Basophils Absolute 12/26/2022 0.0  0.0 - 0.2 K/uL Final    Sodium 12/26/2022 138  136 - 145 mmol/L Final    Potassium reflex Magnesium 12/26/2022 3.9  3.5 - 5.1 mmol/L Final    Chloride 12/26/2022 102  99 - 110 mmol/L Final    CO2 12/26/2022 27  21 - 32 mmol/L Final    Anion Gap 12/26/2022 9  3 - 16 Final    Glucose 12/26/2022 124 (A)  70 - 99 mg/dL Final    BUN 12/26/2022 22 (A)  7 - 20 mg/dL Final    Creatinine 12/26/2022 1.3  0.9 - 1.3 mg/dL Final    Est, Glom Filt Rate 12/26/2022 >60  >60 Final    Comment: Pediatric calculator link  Dene[employer].at. org/professionals/kdoqi/gfr_calculatorped  Effective Oct 3, 2022  These results are not intended for use in patients  <25years of age. eGFR results are calculated without  a race factor using the 2021 CKD-EPI equation. Careful  clinical correlation is recommended, particularly when  comparing to results calculated using previous equations.   The CKD-EPI equation is less accurate in patients with  extremes of muscle mass, extra-renal metabolism of  creatinine, excessive creatinine ingestion, or following  therapy that affects renal tubular secretion. Calcium 12/26/2022 9.3  8.3 - 10.6 mg/dL Final    Total Protein 12/26/2022 7.4  6.4 - 8.2 g/dL Final    Albumin 12/26/2022 4.2  3.4 - 5.0 g/dL Final    Albumin/Globulin Ratio 12/26/2022 1.3  1.1 - 2.2 Final    Total Bilirubin 12/26/2022 <0.2  0.0 - 1.0 mg/dL Final    Alkaline Phosphatase 12/26/2022 127  40 - 129 U/L Final    ALT 12/26/2022 21  10 - 40 U/L Final    AST 12/26/2022 20  15 - 37 U/L Final    Amphetamine Screen, Urine 12/26/2022 POSITIVE (A)  Negative <1000ng/mL Final    Comment: High concentrations of ephedrine/pseudoephedrine or  phenylpropanolamine may cause false positive results  for amphetamine. Therefore, confirmatory testing for  amphetamine should be considered if clinically indicated. Barbiturate Screen, Ur 12/26/2022 Neg  Negative <200 ng/mL Final    Benzodiazepine Screen, Urine 12/26/2022 Neg  Negative <200 ng/mL Final    Cannabinoid Scrn, Ur 12/26/2022 POSITIVE (A)  Negative <50 ng/mL Final    Cocaine Metabolite Screen, Urine 12/26/2022 Neg  Negative <300 ng/mL Final    Opiate Scrn, Ur 12/26/2022 Neg  Negative <300 ng/mL Final    Comment: \"Therapeutic levels of pain medication, especially oxycontin and synthetic  opioids, may not be detected by this Methodology. Pain management screen  panel  Drug panel-PM-Hi Res Ur, Interp (PAIN) should be considered for drug  monitoring \". PCP Screen, Urine 12/26/2022 Neg  Negative <25 ng/mL Final    Methadone Screen, Urine 12/26/2022 Neg  Negative <300 ng/mL Final    Oxycodone Urine 12/26/2022 Neg  Negative <100 ng/ml Final    FENTANYL SCREEN, URINE 12/26/2022 Neg  Negative <5 ng/mL Final    pH, UA 12/26/2022 7.0   Final    Comment: Urine pH less than 5.0 or greater than 8.0 may indicate sample adulteration. Another sample should be collected if clinically  indicated.       Drug Screen Comment: 12/26/2022 see below   Final    Comment: This method is a screening test to detect only these drug  classes as part of a medical workup. Confirmatory testing  by another method should be ordered if clinically indicated. Ethanol Lvl 12/26/2022 11  mg/dL Final    Comment:    None Detected  Conversion factor:  100 mg/dl = .100 g/dl  For Medical Purposes Only      Salicylate, Serum 05/38/2423 <0.3 (A)  15.0 - 30.0 mg/dL Final    Comment: Therapeutic Range: 15.0-30.0 mg/dL  Toxic: >30.0 mg/dL      Acetaminophen Level 12/26/2022 <5 (A)  10 - 30 ug/mL Final    Comment: Therapeutic Range: 10.0-30.0 ug/mL  Toxic: >=150 ug/mL      SARS-CoV-2 RNA, RT PCR 12/26/2022 NOT DETECTED  NOT DETECTED Final    Comment: Not Detected results do not preclude SARS-CoV-2 infection and  should not be used as the sole basis for patient management  decisions. Results must be combined with clinical observations,  patient history, and epidemiological information. Testing was performed using NANCY CLEMENTINA SARS-CoV-2 and Influenza A/B  nucleic acid assay. This test is a multiplex Real-Time Reverse  Transcriptase Polymerase Chain Reaction (RT-PCR)-based in vitro  diagnostic test intended for the qualitative detection of nucleic  acids from SARS-CoV-2, influenza A, and influenza B in nasopharyngeal  and nasal swab specimens for use under the FDAs Emergency Use  Authorization (EUA) only.     Patient Fact Sheet:  FindDrives.pl  Provider Fact Sheet: FindDrives.pl  EUA: FindDrives.pl  IFU: FindDrives.pl    Methodology:  RT-PCR      INFLUENZA A 12/26/2022 NOT DETECTED  NOT DETECTED Final    INFLUENZA B 12/26/2022 NOT DETECTED  NOT DETECTED Final          Mental Status Exam at Discharge:  Level of consciousness:  awake  Appearance:  well-appearing, in chair, good grooming and good hygiene well-developed, well-nourished  Behavior/Motor:  no abnormalities noted normal gait and station AIMS: 0  Attitude toward examiner:  cooperative, attentive and good eye contact  Speech:  spontaneous, normal rate, normal volume and well articulated  Mood:  dysthymic  Affect:  mood congruent Anxiety: mild  Hallucinations: Absent  Thought processes:  coherent Attention span, Concentration & Attention:  attention span and concentration were age appropriate  Thought content:  No evidence of delusions OCD: none    Insight: normal insight and judgment Cognition:  oriented to person, place, and time  Fund of Knowledge: average  IQ:average Memory: intact  Suicide:  No specific plan to harm self  Sleep: sleeps through the night  Appetite: ok     Reassess Suicide Risk:  no specific plan to harm self Pt has phone numbers to contact if suicidal thoughts recur and states pt will return to the hospital if suicidal feelings return. Hospital Routine Meds:     meloxicam  15 mg Oral Daily    escitalopram  10 mg Oral Daily      Hospital PRN Meds: magnesium hydroxide, acetaminophen, hydrOXYzine HCl, traZODone, nicotine polacrilex   Discharge Meds:    Current Discharge Medication List             Details   escitalopram (LEXAPRO) 10 MG tablet Take 1 tablet by mouth daily  Qty: 30 tablet, Refills: 0                 Disposition - Residence Home or Self Care       Follow Up:  See Discharge Instructions     Total time with patient was 40 minutes and more than 50 % of that time was spent counseling the patient on their symptoms, treatment and expected goals.      Asher Liu - PMHNP-BC

## 2023-01-03 NOTE — PLAN OF CARE
Problem: Self Harm/Suicidality  Goal: Will have no self-injury during hospital stay  Description: INTERVENTIONS:  1. Ensure constant observer at bedside with Q15M safety checks  2. Maintain a safe environment  3. Secure patient belongings  4. Ensure family/visitors adhere to safety recommendations  5. Ensure safety tray has been added to patient's diet order  6. Every shift and PRN: Re-assess suicidal risk via Frequent Screener    1/3/2023 1338 by Darling Brar RN  Outcome: Completed  1/3/2023 0309 by Mary Briggs RN  Outcome: Progressing     Problem: Pain  Goal: Verbalizes/displays adequate comfort level or baseline comfort level  Outcome: Completed     Problem: Anxiety  Goal: Will report anxiety at manageable levels  Description: INTERVENTIONS:  1. Administer medication as ordered  2. Teach and rehearse alternative coping skills  3. Provide emotional support with 1:1 interaction with staff  1/3/2023 1338 by Darling Brar RN  Outcome: Completed  1/3/2023 0309 by Mary Briggs RN  Outcome: Progressing     Problem: Confusion  Goal: Confusion, delirium, dementia, or psychosis is improved or at baseline  Description: INTERVENTIONS:  1. Assess for possible contributors to thought disturbance, including medications, impaired vision or hearing, underlying metabolic abnormalities, dehydration, psychiatric diagnoses, and notify attending LIP  2. [place] high risk fall precautions, as indicated  3. Provide frequent short contacts to provide reality reorientation, refocusing and direction  4. Decrease environmental stimuli, including noise as appropriate  5. Monitor and intervene to maintain adequate nutrition, hydration, elimination, sleep and activity  6. If unable to ensure safety without constant attention obtain sitter and review sitter guidelines with assigned personnel  7.  Initiate Psychosocial CNS and Spiritual Care consult, as indicated  1/3/2023 1338 by Darling Brar, RN  Outcome: Completed  1/3/2023 0309 by Carlos Lee RN  Outcome: Progressing     Problem: Depression/Self Harm  Goal: Effect of psychiatric condition will be minimized and patient will be protected from self harm  Description: INTERVENTIONS:  1. Assess impact of patient's symptoms on level of functioning, self care needs and offer support as indicated  2. Assess patient/family knowledge of depression, impact on illness and need for teaching  3. Provide emotional support, presence and reassurance  4. Assess for possible suicidal thoughts or ideation. If patient expresses suicidal thoughts or statements do not leave alone, initiate Suicide Precautions, move to a room close to the nursing station and obtain sitter  5. Initiate consults as appropriate with Mental Health Professional, Spiritual Care, Psychosocial CNS, and consider a recommendation to the LIP for a Psychiatric Consultation  1/3/2023 1338 by Verba Eisenmenger Cathie Ngo), RN  Outcome: Completed  1/3/2023 0309 by Carlos Lee RN  Outcome: Progressing     Problem: Drug Abuse/Detox  Goal: Will have no detox symptoms and will verbalize plan for changing drug-related behavior  Description: INTERVENTIONS:  1. Administer medication as ordered  2. Monitor physical status  3. Provide emotional support with 1:1 interaction with staff  4. Encourage  recovery focused treatment   Outcome: Completed     Problem: Depression  Goal: Will be euthymic at discharge  Description: INTERVENTIONS:  1. Administer medication as ordered  2. Provide emotional support via 1:1 interaction with staff  3. Encourage involvement in milieu/groups/activities  4. Monitor for social isolation  Outcome: Completed     Problem: Mony  Goal: Will exhibit normal sleep and speech and no impulsivity  Description: INTERVENTIONS:  1. Administer medication as ordered  2. Set limits on impulsive behavior  3.  Make attempts to decrease external stimuli as possible  Outcome: Completed     Problem: Psychosis  Goal: Will report no hallucinations or delusions  Description: INTERVENTIONS:  1. Administer medication as  ordered  2. Assist with reality testing to support increasing orientation  3. Assess if patient's hallucinations or delusions are encouraging self harm or harm to others and intervene as appropriate  Outcome: Completed     Problem: Behavior  Goal: Pt/Family maintain appropriate behavior and adhere to behavioral management agreement, if implemented  Description: INTERVENTIONS:  1. Assess patient/family's coping skills and  non-compliant behavior (including use of illegal substances)  2. Notify security of behavior or suspected illegal substances which indicate the need for search of the family and/or belongings  3. Encourage verbalization of thoughts and concerns in a socially appropriate manner  4. Utilize positive, consistent limit setting strategies supporting safety of patient, staff and others  5. Encourage participation in the decision making process about the behavioral management agreement  6. If a visitor's behavior poses a threat to safety call refer to organization policy. 7. Initiate consult with , Psychosocial CNS, Spiritual Care as appropriate  Outcome: Completed     Problem: Sleep Disturbance  Goal: Will exhibit normal sleeping pattern  Description: INTERVENTIONS:  1. Administer medication as ordered  2. Decrease environmental stimuli, including noise, as appropriate  3. Discourage social isolation and naps during the day  Outcome: Completed     Problem: Self Care Deficit  Goal: Return ADL status to a safe level of function  Description: INTERVENTIONS:  1. Administer medication as ordered  2. Assess ADL deficits and provide assistive devices as needed  3. Obtain PT/OT consults as needed  4.  Assist and instruct patient to increase activity and self care as tolerated  Outcome: Completed

## 2023-01-03 NOTE — GROUP NOTE
Group Therapy Note    Date: 1/3/2023    Group Start Time: 1500  Group End Time: 6332  Group Topic: Psychoeducation    [facility]    RENETTA Lloyd        Group Therapy Note  Clinician brought the group members together to verbally deescalate them milieu after a patient violently acted out and destroyed phones and televisions. After the code violet was over and the member was  from the unit members, the clinician used mindfulness, 5, 4, 3, 2, 1 grounding, guided muscle relaxation, visualization techniques, breathing techniques and ended the group with the members naming an animal for every letter of the alphabet. Attendees: 10       Patient's Goal:      Notes:  Patient was cooperative and fully engaged in the de-escalation group. Patient was able to return to baseline of functioning by the end of the group. Status After Intervention:  Improved    Participation Level:  Active Listener and Interactive    Participation Quality: Appropriate and Attentive      Speech:  normal      Thought Process/Content: Logical      Affective Functioning: Congruent      Mood: anxious and fearful      Level of consciousness:  Preoccupied      Response to Learning: Capable of insight      Endings: None Reported    Modes of Intervention: Support and Activity      Discipline Responsible: /Counselor      Signature:  RENETTA Lloyd

## 2023-01-05 ENCOUNTER — HOSPITAL ENCOUNTER (EMERGENCY)
Age: 58
Discharge: HOME | End: 2023-01-06
Attending: EMERGENCY MEDICINE
Payer: MEDICAID

## 2023-01-05 ENCOUNTER — APPOINTMENT (OUTPATIENT)
Dept: GENERAL RADIOLOGY | Age: 58
End: 2023-01-05
Payer: MEDICAID

## 2023-01-05 DIAGNOSIS — R07.9 CHEST PAIN, UNSPECIFIED TYPE: Primary | ICD-10-CM

## 2023-01-05 DIAGNOSIS — F41.1 ANXIETY STATE: ICD-10-CM

## 2023-01-05 DIAGNOSIS — G47.9 SLEEP DISTURBANCE: ICD-10-CM

## 2023-01-05 LAB
A/G RATIO: 1.4 (ref 1.1–2.2)
ALBUMIN SERPL-MCNC: 4.1 G/DL (ref 3.4–5)
ALP BLD-CCNC: 104 U/L (ref 40–129)
ALT SERPL-CCNC: 34 U/L (ref 10–40)
ANION GAP SERPL CALCULATED.3IONS-SCNC: 7 MMOL/L (ref 3–16)
AST SERPL-CCNC: 32 U/L (ref 15–37)
BASOPHILS ABSOLUTE: 0.1 K/UL (ref 0–0.2)
BASOPHILS RELATIVE PERCENT: 0.8 %
BILIRUB SERPL-MCNC: <0.2 MG/DL (ref 0–1)
BUN BLDV-MCNC: 16 MG/DL (ref 7–20)
CALCIUM SERPL-MCNC: 9.5 MG/DL (ref 8.3–10.6)
CHLORIDE BLD-SCNC: 102 MMOL/L (ref 99–110)
CO2: 28 MMOL/L (ref 21–32)
CREAT SERPL-MCNC: 0.9 MG/DL (ref 0.9–1.3)
D DIMER: 0.35 UG/ML FEU (ref 0–0.6)
EOSINOPHILS ABSOLUTE: 0.3 K/UL (ref 0–0.6)
EOSINOPHILS RELATIVE PERCENT: 3.4 %
GFR SERPL CREATININE-BSD FRML MDRD: >60 ML/MIN/{1.73_M2}
GLUCOSE BLD-MCNC: 91 MG/DL (ref 70–99)
HCT VFR BLD CALC: 41.1 % (ref 40.5–52.5)
HEMOGLOBIN: 14.2 G/DL (ref 13.5–17.5)
LIPASE: 50 U/L (ref 13–60)
LYMPHOCYTES ABSOLUTE: 2.5 K/UL (ref 1–5.1)
LYMPHOCYTES RELATIVE PERCENT: 29 %
MCH RBC QN AUTO: 30.5 PG (ref 26–34)
MCHC RBC AUTO-ENTMCNC: 34.5 G/DL (ref 31–36)
MCV RBC AUTO: 88.4 FL (ref 80–100)
MONOCYTES ABSOLUTE: 0.7 K/UL (ref 0–1.3)
MONOCYTES RELATIVE PERCENT: 8.1 %
NEUTROPHILS ABSOLUTE: 5.1 K/UL (ref 1.7–7.7)
NEUTROPHILS RELATIVE PERCENT: 58.7 %
PDW BLD-RTO: 14.1 % (ref 12.4–15.4)
PLATELET # BLD: 252 K/UL (ref 135–450)
PMV BLD AUTO: 7.8 FL (ref 5–10.5)
POTASSIUM REFLEX MAGNESIUM: 5.1 MMOL/L (ref 3.5–5.1)
RBC # BLD: 4.65 M/UL (ref 4.2–5.9)
SODIUM BLD-SCNC: 137 MMOL/L (ref 136–145)
TOTAL PROTEIN: 7 G/DL (ref 6.4–8.2)
TROPONIN: <0.01 NG/ML
TROPONIN: <0.01 NG/ML
WBC # BLD: 8.7 K/UL (ref 4–11)

## 2023-01-05 PROCEDURE — 85025 COMPLETE CBC W/AUTO DIFF WBC: CPT

## 2023-01-05 PROCEDURE — 6370000000 HC RX 637 (ALT 250 FOR IP): Performed by: EMERGENCY MEDICINE

## 2023-01-05 PROCEDURE — 80053 COMPREHEN METABOLIC PANEL: CPT

## 2023-01-05 PROCEDURE — 84484 ASSAY OF TROPONIN QUANT: CPT

## 2023-01-05 PROCEDURE — 36415 COLL VENOUS BLD VENIPUNCTURE: CPT

## 2023-01-05 PROCEDURE — 85379 FIBRIN DEGRADATION QUANT: CPT

## 2023-01-05 PROCEDURE — 71045 X-RAY EXAM CHEST 1 VIEW: CPT

## 2023-01-05 PROCEDURE — 83690 ASSAY OF LIPASE: CPT

## 2023-01-05 PROCEDURE — 99285 EMERGENCY DEPT VISIT HI MDM: CPT

## 2023-01-05 PROCEDURE — 93005 ELECTROCARDIOGRAM TRACING: CPT | Performed by: EMERGENCY MEDICINE

## 2023-01-05 RX ORDER — HYDROXYZINE 50 MG/1
50 TABLET, FILM COATED ORAL ONCE
Status: COMPLETED | OUTPATIENT
Start: 2023-01-05 | End: 2023-01-05

## 2023-01-05 RX ADMIN — HYDROXYZINE HYDROCHLORIDE 50 MG: 50 TABLET ORAL at 22:00

## 2023-01-05 ASSESSMENT — PAIN DESCRIPTION - PAIN TYPE: TYPE: ACUTE PAIN

## 2023-01-05 ASSESSMENT — PAIN - FUNCTIONAL ASSESSMENT
PAIN_FUNCTIONAL_ASSESSMENT: NONE - DENIES PAIN
PAIN_FUNCTIONAL_ASSESSMENT: 0-10

## 2023-01-05 ASSESSMENT — PAIN SCALES - GENERAL: PAINLEVEL_OUTOF10: 5

## 2023-01-06 ENCOUNTER — TELEPHONE (OUTPATIENT)
Dept: PSYCHIATRY | Age: 58
End: 2023-01-06

## 2023-01-06 VITALS
RESPIRATION RATE: 12 BRPM | OXYGEN SATURATION: 98 % | SYSTOLIC BLOOD PRESSURE: 106 MMHG | TEMPERATURE: 98.2 F | HEART RATE: 61 BPM | DIASTOLIC BLOOD PRESSURE: 67 MMHG

## 2023-01-06 LAB
EKG ATRIAL RATE: 57 BPM
EKG DIAGNOSIS: NORMAL
EKG P AXIS: 70 DEGREES
EKG P-R INTERVAL: 150 MS
EKG Q-T INTERVAL: 400 MS
EKG QRS DURATION: 84 MS
EKG QTC CALCULATION (BAZETT): 389 MS
EKG R AXIS: 80 DEGREES
EKG T AXIS: 63 DEGREES
EKG VENTRICULAR RATE: 57 BPM

## 2023-01-06 PROCEDURE — 93010 ELECTROCARDIOGRAM REPORT: CPT | Performed by: INTERNAL MEDICINE

## 2023-01-06 RX ORDER — HYDROXYZINE 50 MG/1
25-50 TABLET, FILM COATED ORAL 3 TIMES DAILY PRN
Qty: 21 TABLET | Refills: 0 | Status: SHIPPED | OUTPATIENT
Start: 2023-01-06 | End: 2023-01-13

## 2023-01-06 ASSESSMENT — PAIN - FUNCTIONAL ASSESSMENT: PAIN_FUNCTIONAL_ASSESSMENT: NONE - DENIES PAIN
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
## 2023-01-06 NOTE — TELEPHONE ENCOUNTER
Patient contacted writer with question about Medicaid filing that was completed while pt was in [facility] unit. Pt shared that he had asked SW/Financial Aid to sign him up for Medicaid in [facility] due to his current experience of homelessness and need of service connection. Pt stated that he contacted [facility] today and they shared that his application had been submitted to [facility]. Writer utilized active listening while pt ventilated frustration at this error and validated pt's experience. Pt was open to writer contacting Financial Aid worker Vlima Kat as well as passing on her contact information so that pt may contact her directly and determine next steps. Writer left VM for [employer] and pt was provided contact information. Pt thanked writer for assistance.     Casi Chapman MSW, LSW

## 2023-01-19 ENCOUNTER — HOSPITAL ENCOUNTER (INPATIENT)
Age: 58
LOS: 4 days | Discharge: HOME | DRG: 751 | End: 2023-01-23
Attending: PSYCHIATRY & NEUROLOGY | Admitting: PSYCHIATRY & NEUROLOGY
Payer: MEDICAID

## 2023-01-19 DIAGNOSIS — Z76.89 ENCOUNTER FOR PSYCHIATRIC ASSESSMENT: Primary | ICD-10-CM

## 2023-01-19 PROBLEM — F33.9 MAJOR DEPRESSION, RECURRENT (HCC): Status: ACTIVE | Noted: 2023-01-19

## 2023-01-19 LAB
A/G RATIO: 1.7 (ref 1.1–2.2)
ACETAMINOPHEN LEVEL: <5 UG/ML (ref 10–30)
ALBUMIN SERPL-MCNC: 4.3 G/DL (ref 3.4–5)
ALP BLD-CCNC: 105 U/L (ref 40–129)
ALT SERPL-CCNC: 18 U/L (ref 10–40)
AMPHETAMINE SCREEN, URINE: NORMAL
ANION GAP SERPL CALCULATED.3IONS-SCNC: 9 MMOL/L (ref 3–16)
AST SERPL-CCNC: 16 U/L (ref 15–37)
BARBITURATE SCREEN URINE: NORMAL
BASOPHILS ABSOLUTE: 0 K/UL (ref 0–0.2)
BASOPHILS RELATIVE PERCENT: 0.6 %
BENZODIAZEPINE SCREEN, URINE: NORMAL
BILIRUB SERPL-MCNC: 0.3 MG/DL (ref 0–1)
BUN BLDV-MCNC: 18 MG/DL (ref 7–20)
CALCIUM SERPL-MCNC: 9.1 MG/DL (ref 8.3–10.6)
CANNABINOID SCREEN URINE: NORMAL
CHLORIDE BLD-SCNC: 100 MMOL/L (ref 99–110)
CO2: 28 MMOL/L (ref 21–32)
COCAINE METABOLITE SCREEN URINE: NORMAL
CREAT SERPL-MCNC: 1.1 MG/DL (ref 0.9–1.3)
EOSINOPHILS ABSOLUTE: 0.3 K/UL (ref 0–0.6)
EOSINOPHILS RELATIVE PERCENT: 5.2 %
ETHANOL: NORMAL MG/DL (ref 0–0.08)
FENTANYL SCREEN, URINE: NORMAL
GFR SERPL CREATININE-BSD FRML MDRD: >60 ML/MIN/{1.73_M2}
GLUCOSE BLD-MCNC: 93 MG/DL (ref 70–99)
HCT VFR BLD CALC: 42.1 % (ref 40.5–52.5)
HEMOGLOBIN: 14.3 G/DL (ref 13.5–17.5)
INFLUENZA A: NOT DETECTED
INFLUENZA B: NOT DETECTED
LITHIUM DOSE AMOUNT: ABNORMAL
LITHIUM LEVEL: <0.1 MMOL/L (ref 0.6–1.2)
LYMPHOCYTES ABSOLUTE: 2 K/UL (ref 1–5.1)
LYMPHOCYTES RELATIVE PERCENT: 35.7 %
Lab: NORMAL
MCH RBC QN AUTO: 30.2 PG (ref 26–34)
MCHC RBC AUTO-ENTMCNC: 34 G/DL (ref 31–36)
MCV RBC AUTO: 88.9 FL (ref 80–100)
METHADONE SCREEN, URINE: NORMAL
MONOCYTES ABSOLUTE: 0.7 K/UL (ref 0–1.3)
MONOCYTES RELATIVE PERCENT: 11.9 %
NEUTROPHILS ABSOLUTE: 2.7 K/UL (ref 1.7–7.7)
NEUTROPHILS RELATIVE PERCENT: 46.6 %
OPIATE SCREEN URINE: NORMAL
OXYCODONE URINE: NORMAL
PDW BLD-RTO: 13.9 % (ref 12.4–15.4)
PH UA: 5
PHENCYCLIDINE SCREEN URINE: NORMAL
PLATELET # BLD: 261 K/UL (ref 135–450)
PMV BLD AUTO: 7.4 FL (ref 5–10.5)
POTASSIUM SERPL-SCNC: 4.4 MMOL/L (ref 3.5–5.1)
RBC # BLD: 4.73 M/UL (ref 4.2–5.9)
SALICYLATE, SERUM: <0.3 MG/DL (ref 15–30)
SARS-COV-2 RNA, RT PCR: NOT DETECTED
SODIUM BLD-SCNC: 137 MMOL/L (ref 136–145)
TOTAL PROTEIN: 6.9 G/DL (ref 6.4–8.2)
TSH SERPL DL<=0.05 MIU/L-ACNC: 1.29 UIU/ML (ref 0.27–4.2)
WBC # BLD: 5.7 K/UL (ref 4–11)

## 2023-01-19 PROCEDURE — 85025 COMPLETE CBC W/AUTO DIFF WBC: CPT

## 2023-01-19 PROCEDURE — 93005 ELECTROCARDIOGRAM TRACING: CPT | Performed by: PSYCHIATRY & NEUROLOGY

## 2023-01-19 PROCEDURE — 36415 COLL VENOUS BLD VENIPUNCTURE: CPT

## 2023-01-19 PROCEDURE — 87636 SARSCOV2 & INF A&B AMP PRB: CPT

## 2023-01-19 PROCEDURE — 80053 COMPREHEN METABOLIC PANEL: CPT

## 2023-01-19 PROCEDURE — 84443 ASSAY THYROID STIM HORMONE: CPT

## 2023-01-19 PROCEDURE — 83036 HEMOGLOBIN GLYCOSYLATED A1C: CPT

## 2023-01-19 PROCEDURE — 80179 DRUG ASSAY SALICYLATE: CPT

## 2023-01-19 PROCEDURE — 99285 EMERGENCY DEPT VISIT HI MDM: CPT

## 2023-01-19 PROCEDURE — 80178 ASSAY OF LITHIUM: CPT

## 2023-01-19 PROCEDURE — 80307 DRUG TEST PRSMV CHEM ANLYZR: CPT

## 2023-01-19 PROCEDURE — 1240000000 HC EMOTIONAL WELLNESS R&B

## 2023-01-19 PROCEDURE — 80061 LIPID PANEL: CPT

## 2023-01-19 PROCEDURE — 82077 ASSAY SPEC XCP UR&BREATH IA: CPT

## 2023-01-19 PROCEDURE — 80143 DRUG ASSAY ACETAMINOPHEN: CPT

## 2023-01-19 RX ORDER — BENZTROPINE MESYLATE 1 MG/ML
2 INJECTION INTRAMUSCULAR; INTRAVENOUS 2 TIMES DAILY PRN
Status: DISCONTINUED | OUTPATIENT
Start: 2023-01-19 | End: 2023-01-20

## 2023-01-19 RX ORDER — ACETAMINOPHEN 325 MG/1
650 TABLET ORAL EVERY 4 HOURS PRN
Status: DISCONTINUED | OUTPATIENT
Start: 2023-01-19 | End: 2023-01-20

## 2023-01-19 RX ORDER — TRAZODONE HYDROCHLORIDE 50 MG/1
50 TABLET ORAL NIGHTLY PRN
Status: DISCONTINUED | OUTPATIENT
Start: 2023-01-19 | End: 2023-01-20

## 2023-01-19 RX ORDER — NICOTINE 21 MG/24HR
1 PATCH, TRANSDERMAL 24 HOURS TRANSDERMAL DAILY
Status: DISCONTINUED | OUTPATIENT
Start: 2023-01-19 | End: 2023-01-23 | Stop reason: HOSPADM

## 2023-01-19 RX ORDER — IBUPROFEN 400 MG/1
400 TABLET ORAL EVERY 6 HOURS PRN
Status: DISCONTINUED | OUTPATIENT
Start: 2023-01-19 | End: 2023-01-20

## 2023-01-19 RX ORDER — POLYETHYLENE GLYCOL 3350 17 G
2 POWDER IN PACKET (EA) ORAL
Status: DISCONTINUED | OUTPATIENT
Start: 2023-01-19 | End: 2023-01-23 | Stop reason: HOSPADM

## 2023-01-19 RX ORDER — HYDROXYZINE 50 MG/1
50 TABLET, FILM COATED ORAL 3 TIMES DAILY PRN
Status: DISCONTINUED | OUTPATIENT
Start: 2023-01-19 | End: 2023-01-23 | Stop reason: HOSPADM

## 2023-01-19 RX ORDER — OLANZAPINE 10 MG/1
10 TABLET ORAL EVERY 4 HOURS PRN
Status: DISCONTINUED | OUTPATIENT
Start: 2023-01-19 | End: 2023-01-20

## 2023-01-19 RX ORDER — MAGNESIUM HYDROXIDE/ALUMINUM HYDROXICE/SIMETHICONE 120; 1200; 1200 MG/30ML; MG/30ML; MG/30ML
30 SUSPENSION ORAL EVERY 6 HOURS PRN
Status: DISCONTINUED | OUTPATIENT
Start: 2023-01-19 | End: 2023-01-23 | Stop reason: HOSPADM

## 2023-01-19 ASSESSMENT — PAIN - FUNCTIONAL ASSESSMENT: PAIN_FUNCTIONAL_ASSESSMENT: NONE - DENIES PAIN

## 2023-01-19 ASSESSMENT — LIFESTYLE VARIABLES: HOW OFTEN DO YOU HAVE A DRINK CONTAINING ALCOHOL: NEVER

## 2023-01-19 ASSESSMENT — ENCOUNTER SYMPTOMS
RESPIRATORY NEGATIVE: 1
GASTROINTESTINAL NEGATIVE: 1
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
## 2023-01-19 NOTE — ED NOTES
Pt brought to the [facility], changed into safety gown. Blood, urine and covid test sent to lab. Pt resting in treatment room 4.       Aileen Burciaga RN  01/19/23 9456

## 2023-01-19 NOTE — ED NOTES
Presenting Problem:Patient presents to the ED  on a SOB after calling the squad to bring him to the hospital for suicidal ideations. Pt states he is staying in his car next to his daughter's house. He does not have a drivers license or tags on the car so he can not drive. He states he needs to get a medical card, food stamps and a phone in order to improve his life. He states he cant get anything thing done. The people in his daughter's home does not get up until 1-2 pm, and by that time it's really too late to start calling people. The staff here on [facility] applied for food stamps for him. They called his daughter's phone twice to do a phone interview for the food stamps with him, but since they sleep half the day he missed both calls and now they have closed his case. He states he has back issues and is unable to work. His back is always hurting and sometimes he cant even stand up. He states he has  no support system and  no one to help his get things in order. He states last night he had his lexapro out and was going to take them all. He states he would have if he know he would not have done to hell for committing suicide. \"I'm tired of being a burden on everyone, if I knew I would not go to hell I would have already done it. I'm overwhelmed with trying to do everything and not getting anything done. I'm tired of being cold everyday, I'm tired of being hungry everyday and I am tired of hurting everyday. \"  At this point the patient became very tearful and we ended the interview. Pt does endorse SI and states he cant keep living like this and he will have to take a chance on not going to hell because what he is living now, is hell. Appearance/Hygiene:  hospital attire, fair grooming, and fair hygiene   Motor Behavior: repetitive action, patient continually rocking back and forth. States he does it because it helps the pain in his back. Attitude: cooperative, irritable.    Affect: anxiety Speech: normal pitch and normal volume  Mood: depressed, irritable, and sad   Thought Processes: [place]  Perceptions: Absent   Thought content: Clear   Orientation: A&Ox4   Memory: intact  Concentration: Fair    Insight/ judgement: impaired judgment and insight    Psychosocial and contextual factors: states he is unable to work due to the condition of his back. Living in his car next door to his daughter. Does not have a drivers license, his car does not have tags. C-SSRS flowsheet is  Complete. Psychiatric History (including current outpatient provider and past inpatient admissions): [facility] 12/26/22 to 1/3/23, [place] 2012. DEVAUGHN Michelle. Access to Firearms: Denies. ASSESSMENT FOR IMMINENT FUTURE DANGER:    RISK FACTORS:    [x]  Age <25 or >49   [x]  Male gender   [x]  Depressed mood   [x]  Active suicidal ideation   [x]  Suicide plan overdose on his lexapro   []  Suicide attempt    [x]  Access to lethal means   []  Prior suicide attempt   []  Active substance abuse    [x]  Highly impulsive behaviors   [x]  Not attending to self-care/ADLs not eating due to not having food, not sleeping well. []  Recent significant loss   []  Chronic pain or medical illness   [x]  Social isolation  pt has no support from anyone. []  History of violence    []  Active psychosis   []  Cognitive impairment    []  No outpatient services in place   []  Medication noncompliance  was taking lexapro, started having chest pain went to hospital, they told him to stop taking it.    [x]  No collateral information to support safety  [] Self- injurious/ Self-harm behavior    PROTECTIVE FACTORS:  [] Age >25 and <55  [] Female gender   [] Denies depression  [] Denies suicidal ideation  [] Does not have lethal plan   [] Does not have access to guns or weapons  [] Patient is verbally walter for safety  [x] No prior suicide attempts  [x] No active substance abuse  [] Patient has social or family support  [] No active psychosis or cognitive dysfunction  [] Physically healthy  [x] Has outpatient services in place  Tavares Ceron.    [] Compliant with recommended medications          Katherine Pendleton RN  01/19/23 [id number]

## 2023-01-19 NOTE — ED NOTES
Level of Care Disposition: Admit    Patient was seen by ED provider and [facility] AN AFFILIATE OF [facility] staff. The case presented to psychiatric provider on-call Dr. Aida Caro. Based on the ED evaluation and information presented to the provider by Deya Gutierrez, it is the recommendation of the on call psychiatric provider that inpatient hospitalization is the least restrictive environment for the patient at this time. The patient will be admitted to the inpatient unit. Admitting provider did not order suicide precautions based on the unit is a safe and secure environment. RATIONALE FOR ADMISSION:     Patient at imminent risk of danger to self as demonstrated by verbalizing his desire to commit suicide with plan to overdose on his lexapro.      Insurance Pre certification Authorization:  WILL Parker RN  01/19/23 499

## 2023-01-20 LAB
CHOLESTEROL, TOTAL: 185 MG/DL (ref 0–199)
EKG ATRIAL RATE: 57 BPM
EKG DIAGNOSIS: NORMAL
EKG P AXIS: 74 DEGREES
EKG P-R INTERVAL: 150 MS
EKG Q-T INTERVAL: 388 MS
EKG QRS DURATION: 88 MS
EKG QTC CALCULATION (BAZETT): 377 MS
EKG R AXIS: 82 DEGREES
EKG T AXIS: 78 DEGREES
EKG VENTRICULAR RATE: 57 BPM
HDLC SERPL-MCNC: 49 MG/DL (ref 40–60)
LDL CHOLESTEROL CALCULATED: 121 MG/DL
TRIGL SERPL-MCNC: 75 MG/DL (ref 0–150)
VLDLC SERPL CALC-MCNC: 15 MG/DL

## 2023-01-20 PROCEDURE — 6370000000 HC RX 637 (ALT 250 FOR IP): Performed by: PSYCHIATRY & NEUROLOGY

## 2023-01-20 PROCEDURE — 93010 ELECTROCARDIOGRAM REPORT: CPT | Performed by: INTERNAL MEDICINE

## 2023-01-20 PROCEDURE — 1240000000 HC EMOTIONAL WELLNESS R&B

## 2023-01-20 RX ORDER — DULOXETIN HYDROCHLORIDE 30 MG/1
30 CAPSULE, DELAYED RELEASE ORAL DAILY
Status: DISCONTINUED | OUTPATIENT
Start: 2023-01-20 | End: 2023-01-21

## 2023-01-20 RX ORDER — ACETAMINOPHEN 325 MG/1
650 TABLET ORAL 3 TIMES DAILY
Status: DISCONTINUED | OUTPATIENT
Start: 2023-01-20 | End: 2023-01-23 | Stop reason: HOSPADM

## 2023-01-20 RX ORDER — MELOXICAM 15 MG/1
15 TABLET ORAL DAILY
Status: DISCONTINUED | OUTPATIENT
Start: 2023-01-20 | End: 2023-01-23 | Stop reason: HOSPADM

## 2023-01-20 RX ORDER — TRAZODONE HYDROCHLORIDE 100 MG/1
100 TABLET ORAL NIGHTLY PRN
Status: DISCONTINUED | OUTPATIENT
Start: 2023-01-20 | End: 2023-01-23 | Stop reason: HOSPADM

## 2023-01-20 RX ADMIN — TRAZODONE HYDROCHLORIDE 100 MG: 50 TABLET ORAL at 21:38

## 2023-01-20 RX ADMIN — IBUPROFEN 400 MG: 400 TABLET ORAL at 07:50

## 2023-01-20 RX ADMIN — DULOXETINE HYDROCHLORIDE 30 MG: 30 CAPSULE, DELAYED RELEASE ORAL at 14:40

## 2023-01-20 RX ADMIN — ACETAMINOPHEN 650 MG: 325 TABLET ORAL at 14:40

## 2023-01-20 RX ADMIN — ACETAMINOPHEN 650 MG: 325 TABLET ORAL at 21:38

## 2023-01-20 RX ADMIN — IBUPROFEN 400 MG: 400 TABLET ORAL at 01:30

## 2023-01-20 RX ADMIN — HYDROXYZINE HYDROCHLORIDE 50 MG: 50 TABLET ORAL at 21:38

## 2023-01-20 RX ADMIN — HYDROXYZINE HYDROCHLORIDE 50 MG: 50 TABLET ORAL at 01:31

## 2023-01-20 RX ADMIN — MELOXICAM 15 MG: 15 TABLET ORAL at 14:40

## 2023-01-20 RX ADMIN — TRAZODONE HYDROCHLORIDE 50 MG: 50 TABLET ORAL at 01:31

## 2023-01-20 ASSESSMENT — PAIN SCALES - WONG BAKER: WONGBAKER_NUMERICALRESPONSE: 0

## 2023-01-20 ASSESSMENT — PAIN DESCRIPTION - LOCATION
LOCATION: BACK

## 2023-01-20 ASSESSMENT — PAIN DESCRIPTION - ORIENTATION
ORIENTATION: LOWER
ORIENTATION: MID

## 2023-01-20 ASSESSMENT — PAIN DESCRIPTION - DESCRIPTORS
DESCRIPTORS: ACHING
DESCRIPTORS: SHOOTING
DESCRIPTORS: ACHING
DESCRIPTORS: SHOOTING

## 2023-01-20 ASSESSMENT — LIFESTYLE VARIABLES
HOW MANY STANDARD DRINKS CONTAINING ALCOHOL DO YOU HAVE ON A TYPICAL DAY: PATIENT DOES NOT DRINK
HOW OFTEN DO YOU HAVE A DRINK CONTAINING ALCOHOL: NEVER

## 2023-01-20 ASSESSMENT — SLEEP AND FATIGUE QUESTIONNAIRES
DO YOU HAVE DIFFICULTY SLEEPING: NO
DO YOU USE A SLEEP AID: NO

## 2023-01-20 ASSESSMENT — PAIN - FUNCTIONAL ASSESSMENT
PAIN_FUNCTIONAL_ASSESSMENT: ACTIVITIES ARE NOT PREVENTED
PAIN_FUNCTIONAL_ASSESSMENT: PREVENTS OR INTERFERES SOME ACTIVE ACTIVITIES AND ADLS
PAIN_FUNCTIONAL_ASSESSMENT: PREVENTS OR INTERFERES SOME ACTIVE ACTIVITIES AND ADLS

## 2023-01-20 ASSESSMENT — PAIN SCALES - GENERAL
PAINLEVEL_OUTOF10: 2
PAINLEVEL_OUTOF10: 6
PAINLEVEL_OUTOF10: 4
PAINLEVEL_OUTOF10: 3
PAINLEVEL_OUTOF10: 0
PAINLEVEL_OUTOF10: 7
PAINLEVEL_OUTOF10: 3

## 2023-01-20 NOTE — PROGRESS NOTES
Patient requested something for pain. He is medicated with ibuprofen 400 mg po for c/o pain in back that he rates at a 4. He was medicated with trazodone 50 mg po for sleep. He was also provided with atarax 50 mg po for anxiety. Will monitor for effectiveness.

## 2023-01-20 NOTE — PROGRESS NOTES
.. [facility]  Admission Note     Admission Type:   Admission Type: Voluntary    Reason for admission:  Reason for Admission: SI, almost overdosed on lexapro the night before coming into the hospital. Unable to contract for safety in the community.       Addictive Behavior:   Addictive Behavior  In the Past 3 Months, Have You Felt or Has Someone Told You That You Have a Problem With  : None    Medical Problems:   Past Medical History:   Diagnosis Date    Major [facility], [facility] 1/19/2023       Status EXAM:  Mental Status and Behavioral Exam  Normal: No  Level of Assistance: Independent/Self  Facial Expression: Flat  Affect: Blunt  Level of Consciousness: Alert  Frequency of Checks: 4 times per hour, close  Mood:Normal: No  Mood: Depressed, Anxious, Helpless, Irritable  Motor Activity:Normal: Yes  Eye Contact: Good  Observed Behavior: Cooperative, Impulsive, Guarded  Sexual Misconduct History: Current - no  Preception: [place] to person, [place] to time, [place] to place, [place] to situation  Attention:Normal: Yes  Attention: Distractible  Thought Processes: Circumstantial  Thought Content:Normal: No  Thought Content: Paranoia  Depression Symptoms: Appetite change, Change in energy level, Crying, Feelings of helplessness, Feelings of hopelessess, Impaired concentration, Increased irritability, Isolative, Loss of interest  Anxiety Symptoms: Generalized  Mony Symptoms: Poor judgment  Hallucinations: None  Delusions: Yes  Delusions: Paranoid  Memory:Normal: Yes  Insight and Judgment: No  Insight and Judgment: Poor judgment, Poor insight    Tobacco Screening:  Practical Counseling, on admission, ilya X, if applicable and completed (first 3 are required if patient doesn't refuse):            ( ) Recognizing danger situations (included triggers and roadblocks)                    ( ) Coping skills (new ways to manage stress,relaxation techniques, changing routine, distraction) (X ) Basic information about quitting (benefits of quitting, techniques in how to quit, available resources  ( ) Referral for counseling faxed to [facility]                                                                                                                   ( ) Patient refused counseling  ( ) Patient has not smoked in the last 30 days    Metabolic Screening:    No results found for: LABA1C    No results found for: CHOL  No results found for: TRIG  No results found for: HDL  No components found for: LDLCAL  No results found for: LABVLDL      Body mass index is 25.1 kg/m².     BP Readings from Last 2 Encounters:   01/20/23 97/76   01/06/23 106/67           Lilli Valdez RN

## 2023-01-20 NOTE — PROGRESS NOTES
Behavioral Services  Medicare Certification Upon Admission    I certify that this patient's inpatient psychiatric hospital admission is medically necessary for:    [x] (1) Treatment which could reasonably be expected to improve this patient's condition,       [x] (2) Or for diagnostic study;     AND     [x](2) The inpatient psychiatric services are provided while the individual is under the care of a physician and are included in the individualized plan of care.     Estimated length of stay/service 4-6 days    Plan for post-hospital care incomplete     Electronically signed by Sharita Mcgovern MD on 1/20/2023 at 2:21 PM
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
## 2023-01-20 NOTE — PROGRESS NOTES
Patient is resting quietly in bed, Both eyes closed and respirations are even and easy. Medications are noted to be effective.

## 2023-01-20 NOTE — PROGRESS NOTES
Patient arrived to the [facility] from [facility] AN AFFILIATE OF [facility] via w/c with two hospital employee escort to [facility]. He is alert and oriented x 4. He denied SI/HI,A/V hallucinations upon arrival to the [facility]. Vital signs are obtained, patient provided with food and fluid. Offers c/o pain and he is rocking back and forth from the pain. Explained pain scale to patient and he is familiar with this and he rated his current back pain at a 4.

## 2023-01-20 NOTE — GROUP NOTE
Group Therapy Note    Date: 1/20/2023    Group Start Time: 1300  Group End Time: 4672  Group Topic: Recreational    [id number] [address]        Group Therapy Note    Attendees: 8    Group engaged in multiple rounds of [employer]. Group members were given a letter and 12 categories. The categories had to start with the letter given. Group members were split into two groups and were timed to complete the 12 categories. Notes:  Pino Villaseñor attended group for the full duration. Pino Villaseñor remained engaged and interacted appropriately with other members of the group. Status After Intervention:  Improved    Participation Level:  Active Listener and Interactive    Participation Quality: Appropriate, Attentive, and Sharing      Speech:  normal      Thought Process/Content: Logical      Affective Functioning: Congruent      Mood: euthymic      Level of consciousness:  Alert      Response to Learning: Able to verbalize current knowledge/experience      Endings: None Reported    Modes of Intervention: Socialization, Exploration, and Activity      Discipline Responsible: Behavorial Health Tech      Signature:  RENETTA Angelo

## 2023-01-20 NOTE — H&P
Patient has been seen and evaluated within 30 days by IM provider and medically cleared for admission to [facility]. Please refer to medical H&P from 12/26/2022 and see respective assessment and plan as summarized below. #SI  - per psychiatry team     #Polysubstance Abuse  - reports using meth and marijuana   - UDS + amphetamines and cannabinoid   - counseled cessation     #Clubbing on bilateral fingernails and toenails  - hx of smoking  - CXR WNLs     #Overgrown toenails  #Fungal growth left great toe  - podiatry consulted      #Tobacco Dependence  - recommended cessation  - nicotine replacement ordered      #Homelessness         Current vitals reviewed and stable. Labs reviewed and nothing to follow. Orders reviewed. Please contact with IM provider with concerns.     Marta Simmons PA-C  1/20/2023   9:32 AM

## 2023-01-20 NOTE — ED NOTES
Provided snack box, and water. Pt stated he \"thought he was going upstairs at 7.\" Currently rocking back and forth on chair in treatment room. Will continue to monitor.       Sasha Vyas  01/20/23 0000

## 2023-01-20 NOTE — PLAN OF CARE
Paty Eugene is alert and oriented X4. He is flat and sad. He states his daughter got  young and moved to [place] no a  base with her . She finished nursing school and soon after had an accidental overdose on Fentanyl. \"She came home in a body bag. \" He began to cry and state he missed her so much. The 15 year anniversary of her death was yesterday and he sat in his car with an open bottle of pills up to his lips. That was when he decided to call 911. He states he just has never dealt with his grief. Paty Eugene continued to cry as he talked about his grandchildren. He states he doesn't want them to see him this way, because they would not understand. He continues to endorse depression, but states he is not suicidal. He states he is attempting to get a medical card and food stamps, and it looks like he might get them. If he does he will be able to have back surgery and go to the dentist. He also states that he may be able to get into a homeless shelter. He is looking forward to the shelter. He denies AVH and has not been RTIS. He denies any needs.   Problem: Pain  Goal: Verbalizes/displays adequate comfort level or baseline comfort level  Outcome: Not Progressing

## 2023-01-20 NOTE — ED NOTES
Currently resting peacefully in treatment room. Appears to be asleep. Respiration even, and easy. Will continue to monitor.      Esther Huizar  01/19/23 [id number]

## 2023-01-20 NOTE — GROUP NOTE
Group Therapy Note    Date: 1/20/2023    Group Start Time: 1000  Group End Time: 9615  Group Topic: Psychoeducation    [address] [place] [address]        Group Therapy Note    Topic: Emotional Regulation skills    Group members explored 5 separate tools for emotional regulation through cognitive restructuring and behavioral modification. Attendees: 7       Notes: This pt was present and attentive across psychoeducation group. Minimal verbal engagement but remained attentive across duration of intervention and education discussion. No needs verbalized at conclusion of session.     Status After Intervention:  Improved    Participation Level: Minimal    Participation Quality: Lethargic      Speech:  mute      Thought Process/Content: Linear      Affective Functioning: Congruent      Mood: depressed      Level of consciousness:  Attentive      Response to Learning: Progressing to goal      Endings: None Reported    Modes of Intervention: Support, Socialization, Exploration, and Problem-solving      Discipline Responsible: Psychoeducational Specialist      Signature:  Alicia Garrison MM, MT-BC

## 2023-01-20 NOTE — CARE COORDINATION
[facility]  Treatment Team Note  Day 1    Review Date & Time: 0900  1/20/2023    Patient was not in treatment team      Status EXAM:   Mental Status and Behavioral Exam  Normal: No  Level of Assistance: Independent/Self  Facial Expression: Sad  Affect: Congruent  Level of Consciousness: Alert  Frequency of Checks: 4 times per hour, close  Mood:Normal: No  Mood: Depressed, Guilty, Helpless, Worthless, low self-esteem, Sad  Motor Activity:Normal: Yes  Eye Contact: Good  Observed Behavior: Cooperative, Tearful  Sexual Misconduct History: Current - no  Preception: [place] to person, [place] to time, [place] to place, [place] to situation  Attention:Normal: Yes  Attention: Distractible  Thought Processes:  (Linear)  Thought Content:Normal: No  Thought Content: Paranoia  Depression Symptoms: Change in energy level, Crying, Feelings of helplessness, Feelings of hopelessess, Feelings of worthlessness, Isolative, Loss of interest  Anxiety Symptoms: No problems reported or observed.  Mony Symptoms: No problems reported or observed.  Hallucinations: None  Delusions: No  Delusions: Paranoid  Memory:Normal: Yes  Insight and Judgment: No  Insight and Judgment: Poor judgment, Poor insight      Suicide Risk CSSR-S:  1) Within the past month, have you wished you were dead or wished you could go to sleep and not wake up? : Yes  2) Have you actually had any thoughts of killing yourself? : Yes  3) Have you been thinking about how you might kill yourself? : Yes  5) Have you started to work out or worked out the details of how to kill yourself? Do you intend to carry out this plan? : No  6) Have you ever done anything, started to do anything, or prepared to do anything to end your life?: No      PLAN/TREATMENT RECOMMENDATIONS UPDATE: Patient will take medication as prescribed, eat 75% of meals, attend groups, participate in milieu activities, participate in treatment team and care planning for discharge and follow  up.        Jordy Garcia RN

## 2023-01-21 LAB
ESTIMATED AVERAGE GLUCOSE: 114 MG/DL
HBA1C MFR BLD: 5.6 %

## 2023-01-21 PROCEDURE — 1240000000 HC EMOTIONAL WELLNESS R&B

## 2023-01-21 PROCEDURE — 6370000000 HC RX 637 (ALT 250 FOR IP): Performed by: PSYCHIATRY & NEUROLOGY

## 2023-01-21 RX ORDER — ACETAMINOPHEN 325 MG/1
650 TABLET ORAL ONCE
Status: COMPLETED | OUTPATIENT
Start: 2023-01-21 | End: 2023-01-21

## 2023-01-21 RX ORDER — DULOXETIN HYDROCHLORIDE 60 MG/1
60 CAPSULE, DELAYED RELEASE ORAL DAILY
Status: DISCONTINUED | OUTPATIENT
Start: 2023-01-22 | End: 2023-01-23 | Stop reason: HOSPADM

## 2023-01-21 RX ADMIN — ACETAMINOPHEN 650 MG: 325 TABLET ORAL at 05:14

## 2023-01-21 RX ADMIN — MELOXICAM 15 MG: 15 TABLET ORAL at 08:13

## 2023-01-21 RX ADMIN — HYDROXYZINE HYDROCHLORIDE 50 MG: 50 TABLET ORAL at 20:31

## 2023-01-21 RX ADMIN — DULOXETINE HYDROCHLORIDE 30 MG: 30 CAPSULE, DELAYED RELEASE ORAL at 08:14

## 2023-01-21 RX ADMIN — HYDROXYZINE HYDROCHLORIDE 50 MG: 50 TABLET ORAL at 13:43

## 2023-01-21 RX ADMIN — ACETAMINOPHEN 650 MG: 325 TABLET ORAL at 16:00

## 2023-01-21 RX ADMIN — ACETAMINOPHEN 650 MG: 325 TABLET ORAL at 20:30

## 2023-01-21 RX ADMIN — TRAZODONE HYDROCHLORIDE 100 MG: 50 TABLET ORAL at 20:31

## 2023-01-21 RX ADMIN — ACETAMINOPHEN 650 MG: 325 TABLET ORAL at 08:14

## 2023-01-21 ASSESSMENT — PAIN SCALES - GENERAL
PAINLEVEL_OUTOF10: 8
PAINLEVEL_OUTOF10: 0
PAINLEVEL_OUTOF10: 10
PAINLEVEL_OUTOF10: 3

## 2023-01-21 ASSESSMENT — PAIN DESCRIPTION - ORIENTATION
ORIENTATION: LEFT;LOWER
ORIENTATION: LEFT
ORIENTATION: LEFT

## 2023-01-21 ASSESSMENT — PAIN DESCRIPTION - LOCATION
LOCATION: HIP
LOCATION: BACK;HIP
LOCATION: BACK

## 2023-01-21 ASSESSMENT — PAIN - FUNCTIONAL ASSESSMENT: PAIN_FUNCTIONAL_ASSESSMENT: PREVENTS OR INTERFERES SOME ACTIVE ACTIVITIES AND ADLS

## 2023-01-21 ASSESSMENT — PAIN DESCRIPTION - DESCRIPTORS
DESCRIPTORS: ACHING;SHOOTING
DESCRIPTORS: THROBBING

## 2023-01-21 NOTE — PLAN OF CARE
Asher Lafleur is alert and oriented. He is pleasant and cooperative. He is in pain this am attempting to prop his left leg up. He states his pain is an 8/10. He was medicated with his am medications. He denies SI/HI. He denies any hallucinations and has not been noted to be RTIS. Asher Lafleur states he is still depressed and uncertain about his future. He denies any further needs and is sleeping soundly at this time. Respirations are even and he is snoring.   Problem: Pain  Goal: Verbalizes/displays adequate comfort level or baseline comfort level  1/21/2023 0940 by Sangeetha Lux RN  Outcome: Not Progressing  1/20/2023 2304 by Charlie Espinoza RN  Outcome: Progressing  Flowsheets (Taken 1/20/2023 2304)  Verbalizes/displays adequate comfort level or baseline comfort level: Encourage patient to monitor pain and request assistance

## 2023-01-21 NOTE — PROGRESS NOTES
Department of Psychiatry  Progress Note    Patient's chart was reviewed. Discussed with treatment team. Met with patient. SUBJECTIVE:      Remains depressed and tearful. Tolerating Cymbalta well so far. Was able to confirm he was approved for food assistance. Less SI.    ROS:   Patient has new complaints: no  Sleeping adequately:  No: back pain   Appetite adequate: Yes  Engaged in programming: No:    OBJECTIVE:  VITALS:  /78   Pulse 59   Temp 97.3 °F (36.3 °C) (Oral)   Resp 16   Ht 5' 9\" (1.753 m)   Wt 170 lb (77.1 kg)   SpO2 98%   BMI 25.10 kg/m²     Mental Status Examination:    Appearance: fair grooming and hygiene  Behavior/Attitude toward examiner:  cooperative, attentive and fair eye contact  Speech: soft  Mood:  \"down\"  Affect:  tearful    Thought processes:  Goal directed, linear, no ESTEPHANIA or gross disorganization  Thought Content: less SI, no HI, no delusions voiced, no obsessions  Perceptions: no AVH  Attention: attention span and concentration were intact to interview   Abstraction: intact  Cognition:  Alert and oriented to person, place, time, and situation, recall intact  Insight: fair  Judgment: fair     Medication:  Scheduled:   meloxicam  15 mg Oral Daily    DULoxetine  30 mg Oral Daily    acetaminophen  650 mg Oral TID    nicotine  1 patch TransDERmal Daily        PRN:  traZODone, magnesium hydroxide, nicotine polacrilex, aluminum & magnesium hydroxide-simethicone, hydrOXYzine HCl     FORMULATION:  This is a houseless, , and unemployed 79-year-old  with a history of depression and substance use disorders, who was  brought to our emergency department on a statement of belief by slime  after calling 911 reporting thoughts of suicide. He meets criteria for  major depressive disorder recurrent, severe without psychotic features. He was admitted for further evaluation and treatment.     Principal Problem:    Severe episode of recurrent major depressive disorder, without psychotic features ([address]  Active Problems:    Cannabis use disorder, moderate, dependence (HCC)    Methamphetamine use disorder, mild (HCC)    Alcohol use disorder, severe, in sustained remission ([address]    Cocaine use disorder, severe, in sustained remission (HCC)    Chronic low back pain    Clubbing of fingers  Resolved Problems:    * No resolved hospital problems. *     PLAN:  1. Admitted to Psychiatry for further stabilization and treatment. 2. On admission, started Cymbalta 30 mg daily. Ordered q. 15-minute checks for safety, programming, and p.r.n. medication for anxiety, agitation, insomnia. 1/21/2023 - increase Cymbalta to 60mg tomorrow. 3.  Hospitalist consult for admission. Resumed Mobic and scheduled  Tylenol for low back pain. #Clubbing on bilateral fingernails and toenails  - hx of smoking  - CXR WNLs     #Overgrown toenails  #Fungal growth left great toe  - podiatry consulted     4. ELOS 5-8 days. admitted on a statement of belief but agreed to stay voluntarily. Total time with patient was 50 minutes and more than 50 % of that time was spent counseling the patient on their symptoms, treatment, and expected goals.      Ceferino Denson MD

## 2023-01-21 NOTE — PLAN OF CARE
Problem: Self Harm/Suicidality  Goal: Will have no self-injury during hospital stay  Description: INTERVENTIONS:  1. Ensure constant observer at bedside with Q15M safety checks  2. Maintain a safe environment  3. Secure patient belongings  4. Ensure family/visitors adhere to safety recommendations  5. Ensure safety tray has been added to patient's diet order  6.   Every shift and PRN: Re-assess suicidal risk via Frequent Screener    1/20/2023 2304 by Tellis Sandifer, RN  Outcome: Progressing  1/20/2023 1628 by Dirk Briggs RN  Outcome: Progressing  Flowsheets (Taken 1/20/2023 1608)  Will have no self-injury during hospital stay: Maintain a safe environment     Problem: Pain  Goal: Verbalizes/displays adequate comfort level or baseline comfort level  1/20/2023 2304 by Tellis Sandifer, RN  Outcome: Progressing  Flowsheets (Taken 1/20/2023 2304)  Verbalizes/displays adequate comfort level or baseline comfort level: Encourage patient to monitor pain and request assistance  1/20/2023 1628 by Dirk Briggs RN  Outcome: Not Progressing

## 2023-01-21 NOTE — PROGRESS NOTES
New order for additional dose Tylenol 650mg for left hip pain. Provided per order. Patient resting in bed.

## 2023-01-21 NOTE — PROGRESS NOTES
Patient woke at 0430 with c/o pain in left hip 10/10. No orders on board, charge nurse reached out to physician for orders.

## 2023-01-22 PROCEDURE — 1240000000 HC EMOTIONAL WELLNESS R&B

## 2023-01-22 PROCEDURE — 6370000000 HC RX 637 (ALT 250 FOR IP): Performed by: PSYCHIATRY & NEUROLOGY

## 2023-01-22 RX ADMIN — ACETAMINOPHEN 650 MG: 325 TABLET ORAL at 08:21

## 2023-01-22 RX ADMIN — HYDROXYZINE HYDROCHLORIDE 50 MG: 50 TABLET ORAL at 21:20

## 2023-01-22 RX ADMIN — ACETAMINOPHEN 650 MG: 325 TABLET ORAL at 21:19

## 2023-01-22 RX ADMIN — DULOXETINE HYDROCHLORIDE 60 MG: 60 CAPSULE, DELAYED RELEASE ORAL at 08:21

## 2023-01-22 RX ADMIN — TRAZODONE HYDROCHLORIDE 100 MG: 50 TABLET ORAL at 21:20

## 2023-01-22 RX ADMIN — MELOXICAM 15 MG: 15 TABLET ORAL at 08:21

## 2023-01-22 RX ADMIN — ACETAMINOPHEN 650 MG: 325 TABLET ORAL at 15:21

## 2023-01-22 ASSESSMENT — PAIN - FUNCTIONAL ASSESSMENT: PAIN_FUNCTIONAL_ASSESSMENT: PREVENTS OR INTERFERES SOME ACTIVE ACTIVITIES AND ADLS

## 2023-01-22 ASSESSMENT — PAIN SCALES - GENERAL
PAINLEVEL_OUTOF10: 3
PAINLEVEL_OUTOF10: 8

## 2023-01-22 ASSESSMENT — PAIN SCALES - WONG BAKER: WONGBAKER_NUMERICALRESPONSE: 0

## 2023-01-22 ASSESSMENT — PAIN DESCRIPTION - ORIENTATION
ORIENTATION: LOWER
ORIENTATION: MID

## 2023-01-22 ASSESSMENT — PAIN DESCRIPTION - LOCATION
LOCATION: BACK
LOCATION: BACK

## 2023-01-22 ASSESSMENT — PAIN DESCRIPTION - DESCRIPTORS
DESCRIPTORS: ACHING
DESCRIPTORS: THROBBING;ACHING

## 2023-01-22 NOTE — BH NOTE
Patient was moved to a newer room with a medical bed to help his chronic pain. He was also found to be lying on cardboard now that is his unique preference to help his pain. He denied having any pain this evening when nurse assessed him. He was given his scheduled Tylenol, as well as Vistaril and Trazodone prns to aid with better sleep. He denies hallucinations. He does still endorse suicidal ideations but denies intent to act on them. He does admit that his \"anxiety is lifting\". He denies side effects. He shared that he was approved for the medial card yesterday and that gives him hope of \"finally getting the help that I need\". He has remained pretty isolative to his room not attending group or out in the dayroom. He refused snacks tonight.

## 2023-01-22 NOTE — PLAN OF CARE
Pt lying down this morning due to pain but gets up for breakfast and groups. Pt denies SI,HI,AVH, no distress noted and complies with care and medication.

## 2023-01-22 NOTE — PLAN OF CARE
Problem: Self Harm/Suicidality  Goal: Will have no self-injury during hospital stay  Description: INTERVENTIONS:  1. Ensure constant observer at bedside with Q15M safety checks  2. Maintain a safe environment  3. Secure patient belongings  4. Ensure family/visitors adhere to safety recommendations  5. Ensure safety tray has been added to patient's diet order  6.   Every shift and PRN: Re-assess suicidal risk via Frequent Screener    1/21/2023 2114 by Zander Mims RN  Outcome: Progressing  1/21/2023 0940 by Cj Arce RN  Outcome: Progressing     Problem: Pain  Goal: Verbalizes/displays adequate comfort level or baseline comfort level  1/21/2023 2114 by Zander Mims RN  Outcome: Progressing  1/21/2023 0940 by Cj Arce RN  Outcome: Not Progressing

## 2023-01-22 NOTE — GROUP NOTE
Group Therapy Note    Date: 1/22/2023    Group Start Time: 1100  Group End Time: 1150  Group Topic: Education [address], MSW        Group Therapy Note    Attendees: 11    Group members learned about healthy coping skills and explored different situations where some coping skills could be more helpful than others. Facilitator introduced A-Z coping skills and DBT module concepts (Distress Tolerance, Mindfulness, Emotion Regulation, and Interpersonal Effectiveness). Group members placed the A-Z coping skills into the module categoriesto deepen understanding of effective coping. Patient's Goal:      Notes:  Jeanne Platt was present for all of group and he participated well in the discussion. Status After Intervention:  Improved    Participation Level:  Active Listener and Interactive    Participation Quality: Appropriate, Attentive, Sharing, and Supportive      Speech:  normal      Thought Process/Content: Logical      Affective Functioning: Congruent      Mood: euthymic      Level of consciousness:  Alert, Oriented x4, and Attentive      Response to Learning: Able to verbalize current knowledge/experience, Able to verbalize/acknowledge new learning, Able to retain information, Capable of insight, Able to change behavior, and Progressing to goal      Endings: None Reported    Modes of Intervention: Education, Support, Socialization, Exploration, Clarifying, Problem-solving, and Activity      Discipline Responsible: /Counselor      Signature:  RENETTA Bergeron

## 2023-01-22 NOTE — GROUP NOTE
Group Therapy Note    Date: 1/22/2023    Group Start Time: 1300  Group End Time: 9585  Group Topic: Education [address] MSW        Group Therapy Note    Attendees: 10      Group members participated in a discussion about their community and support system. They explored what they would like their support sytem to look like and ways to meet new people and improve their relationships. Patient's Goal:      Notes:  David Lyman was present for all of group and he participated well in the group discussion. Status After Intervention:  Improved    Participation Level:  Active Listener and Interactive    Participation Quality: Appropriate, Attentive, Sharing, and Supportive      Speech:  normal      Thought Process/Content: Logical      Affective Functioning: Congruent      Mood: euthymic      Level of consciousness:  Alert, Oriented x4, and Attentive      Response to Learning: Able to verbalize current knowledge/experience, Able to verbalize/acknowledge new learning, Able to retain information, Capable of insight, Able to change behavior, and Progressing to goal      Endings: None Reported    Modes of Intervention: Education, Support, Socialization, Exploration, Clarifying, and Problem-solving      Discipline Responsible: /Counselor      Signature:  RENETTA Blunt

## 2023-01-23 VITALS
DIASTOLIC BLOOD PRESSURE: 97 MMHG | BODY MASS INDEX: 25.18 KG/M2 | WEIGHT: 170 LBS | TEMPERATURE: 97.7 F | SYSTOLIC BLOOD PRESSURE: 123 MMHG | OXYGEN SATURATION: 99 % | HEART RATE: 65 BPM | HEIGHT: 69 IN | RESPIRATION RATE: 16 BRPM

## 2023-01-23 PROCEDURE — 6370000000 HC RX 637 (ALT 250 FOR IP): Performed by: PSYCHIATRY & NEUROLOGY

## 2023-01-23 PROCEDURE — 5130000000 HC BRIDGE APPOINTMENT

## 2023-01-23 RX ORDER — DULOXETIN HYDROCHLORIDE 60 MG/1
60 CAPSULE, DELAYED RELEASE ORAL DAILY
Qty: 30 CAPSULE | Refills: 0 | Status: SHIPPED | OUTPATIENT
Start: 2023-01-24

## 2023-01-23 RX ORDER — HYDROXYZINE 50 MG/1
50 TABLET, FILM COATED ORAL DAILY PRN
Qty: 20 TABLET | Refills: 0 | Status: SHIPPED | OUTPATIENT
Start: 2023-01-23 | End: 2023-02-12

## 2023-01-23 RX ORDER — MELOXICAM 15 MG/1
15 TABLET ORAL DAILY
Qty: 30 TABLET | Refills: 0 | Status: SHIPPED | OUTPATIENT
Start: 2023-01-24

## 2023-01-23 RX ADMIN — MELOXICAM 15 MG: 15 TABLET ORAL at 08:32

## 2023-01-23 RX ADMIN — DULOXETINE HYDROCHLORIDE 60 MG: 60 CAPSULE, DELAYED RELEASE ORAL at 08:32

## 2023-01-23 RX ADMIN — ACETAMINOPHEN 650 MG: 325 TABLET ORAL at 08:32

## 2023-01-23 ASSESSMENT — PAIN SCALES - GENERAL: PAINLEVEL_OUTOF10: 4

## 2023-01-23 NOTE — BH NOTE
Out to dinning area for morning meal and attended am group. Makes good eye contact during interaction.

## 2023-01-23 NOTE — GROUP NOTE
Group Therapy Note    Date: 1/23/2023    Group Start Time: 1000  Group End Time: 9421  Group Topic: Healthy Living/Wellness    [facility]        Group Therapy Note    Topic: Positive Psychology - Small Group Discussions    Group members were spilt into small groups and given prompts to explore reflective affirmations. Group members were provided large pieces of paper to write out responses to 3 individual prompts: Good thinks about myself, Good thinks about the world around me, Good things I can come to expect. Group members were then provided sheets with positive prompts to explore: reflective questions about engagement with peers on unit, staff, good things that have happened to them, good things they've witnessed. Attendees: 12       Notes:  Pt was present and actively engaged across group discussions. Social and collaborative with peers across session, engaging and cooperative. Status After Intervention:  Improved    Participation Level:  Active Listener and Interactive    Participation Quality: Appropriate, Sharing, and Supportive      Speech:  normal      Thought Process/Content: Logical      Affective Functioning: Congruent      Mood: euthymic      Level of consciousness:  Alert and Attentive      Response to Learning: Progressing to goal      Endings: None Reported    Modes of Intervention: Education, Support, Socialization, Exploration, and Clarifying      Discipline Responsible: Psychoeducational Specialist      Signature:  Jeovanny Mcneil, MM, MT-BC

## 2023-01-23 NOTE — GROUP NOTE
Group Therapy Note    Date: 1/23/2023    Group Start Time: 1100  Group End Time: 4021  Group Topic: Psychoeducation    [facility]    RENETTA Garcia        Group Therapy Note  Clinician engaged the group members with 20 questions to help challenge negative thoughts. Group members identified intrusive thoughts. Clinician used the poster boards the group members completed in the previous group to help combat the ANTS. Patients completed personal strengths, values and hope for the future on the poster boards. During the 20 questions, group members were able to see their answers from the previous group that applied to countering the negative thoughts. Attendees: 11       Patient's Goal:      Notes:   Patient actively listened to the clinician and the other group members. He participated in the 20 questions and was able to provided empathy and validation to other members. Status After Intervention:  Improved    Participation Level:  Active Listener and Interactive    Participation Quality: Appropriate, Attentive, Sharing, and Supportive      Speech:  normal      Thought Process/Content: Logical      Affective Functioning: Congruent      Mood: anxious and depressed      Level of consciousness:  Attentive      Response to Learning: Capable of insight      Endings: None Reported    Modes of Intervention: Education, Support, and Activity      Discipline Responsible: /Counselor      Signature:  RENETTA Garcia

## 2023-01-23 NOTE — DISCHARGE INSTRUCTIONS
Advanced Directives:  Patient does not have a surrogate decision maker appointed   Name (if yes): Declines Phone Number: Declines  Patient does not have a psychiatric and/ or medical advanced directive or power of . Patient was offered psychiatric and/ or medical advanced directive or power of  information/completion but declined to complete   Why not? Declined     Discharge Planning is Complete. Discharge Date: 1/23/23  Reason for Hospitalization: Suicidal Ideation  Discharge Diagnosis: Severe episode of recurrent major depressive disorder, without psychotic features ([facility]  Discharging to: Private Domicile    Your instructions: Your physician here was Robin Crawford MD. If you have any questions please call the unit at [phone] for the adult unit or [phone] for [facility]. Please note that we have a patient family advisory [place] that meets the second Wednesday of January and the second Wednesday of July at [address] [facility] in [place] at [facility]. Department leadership would love for you to attend to give feedback on what we are doing well and areas in which we can improve our patient care. Please come if you are able and feel free to reach out to [facility] at [phone] with any questions. The crisis number for [facility] [address] is [address] ([address] [place] [address] can use this number at any time to access emergency mental health services. Please follow up with your PCP regarding any pending labs. You are [facility] to [place]. Your  is Carla Fritz. [address] Work staff has left her a voicemail and let her know about your admission and discharge to the hospital.  Please contact her Wednesday 1/25/23 of you do not hear from her. See below.   Provider specialty/license: Carla Fritz    Date and time of appointment: Please contact your  if you do not hear from her by 1/25/23  The type/s of services requested are: Mental Health Treatment  Agency name: [facility]  Address: [address], [address] [facility]  Phone Number: [phone]  Special instructions (what to bring to appointment, etc.):   Please contact your  if you do not hear from her by 1/25/23    Discharge Completed By: RENETTA Schulz  Fax to: Follow up provider name and number  [facility]. Aida Castillo Anger [phone]    The following personal items were collected during your admission and were returned to you:    Belongings  Dental Appliances: None  Vision - Corrective Lenses: Eyeglasses (reading glasses in patients jacket.)  Hearing Aid: None  Clothing: Footwear, Pants, Shirt, Socks (4 pr socks,shoes s laces, 1 pr blue jeans, 1 pr black socks, 1 track suit jacket and top- navy blue, 1 light jacket-black, 1 heavy jacket- grey green, 2 t-shirts-1 blue, 1 green, 1 long sleeve black shirt, 1 -pr red plaid pants, 1  long sleeve shirt.)  Jewelry: None  Body Piercings Removed: N/A  Electronic Devices:  (and cord,)  Weapons (Notify Protective Services/Security): None  Other Valuables: Wallet, Lighter/Matches, Other (Comment) (elf bar vape x 2, wallet contents: [place] ID, SSC, assorted cards, no money.)  Home Medications: None  Valuables Given To: Patient, Locker, Security  Provide Name(s) of Who Valuable(s) Were Given To: n/a  Responsible person(s) in the waiting room: none    By signing below, I understand and acknowledge receipt of the instructions indicated above.

## 2023-01-23 NOTE — PLAN OF CARE
Problem: Pain  Goal: Verbalizes/displays adequate comfort level or baseline comfort level  1/22/2023 2136 by Moira Orlanod RN  Outcome: Progressing   Pt has been regressed to bed this shift. He is pleasant and cooperative with interview. He requested trazodone and vistaril for sleep and anxiety with schedule tylenol dose. Pain minimal to back at this time. Pt denies suicidal or homicidal ideations. He denies auditory or visual hallucinations. He states his day has overall been \"good\" but has had some anxiety, mostly thinking about discharge.

## 2023-01-23 NOTE — PROGRESS NOTES
Group Therapy Note    Date: 1/23/2023  Start Time: 1300  End Time:  1400  Number of Participants: 12    Type of Group: Music Group    Notes:  Pt present for Music Group. Pt actively participated by making song selections and singing along to music. Participation Level:  Active Listener and Interactive    Participation Quality: Appropriate and Attentive      Speech:  normal      Affective Functioning: Congruent      Endings: None Reported    Modes of Intervention: Support, Socialization, Activity, and Media      Discipline Responsible: Chaplain Chung Brought       01/23/23 1443   Encounter Summary   Encounter Overview/Reason  Behavioral Health   Service Provided For: Patient   Last Encounter    (1/23 Music Group)   Complexity of Encounter Moderate   Begin Time 1300   End Time  1400   Total Time Calculated 60 min   Behavioral Health    Type  Spirituality Group

## 2023-01-23 NOTE — BH NOTE
[facility]  Discharge Note    Pt discharged with followings belongings:   Dental Appliances: None  Vision - Corrective Lenses: Eyeglasses (reading glasses in patients jacket.)  Hearing Aid: None  Jewelry: None  Body Piercings Removed: N/A  Clothing: Footwear, Pants, Shirt, Socks (4 pr socks,shoes s laces, 1 pr blue jeans, 1 pr black socks, 1 track suit jacket and top- navy blue, 1 light jacket-black, 1 heavy jacket- grey green, 2 t-shirts-1 blue, 1 green, 1 long sleeve black shirt, 1 -pr red plaid pants, 1  long sleeve shirt.)  Other Valuables: Wallet, Lighter/Matches, Other (Comment) (elf bar vape x 2, wallet contents: [place] ID, SSC, assorted cards, no money.)   Valuables sent home with patient or returned to patient. Patient educated on aftercare instructions: yes. Information faxed to [place] by this writer  at 3:15 PM .Patient verbalize understanding of AVS:  yes. Status EXAM upon discharge:  Mental Status and Behavioral Exam  Normal: No  Level of Assistance: Independent/Self  Facial Expression: Flat  Affect: Congruent  Level of Consciousness: Alert  Frequency of Checks: 4 times per hour, close  Mood:Normal: No  Mood: Depressed (reports feeling less depressed)  Motor Activity:Normal: Yes  Motor Activity: Decreased  Eye Contact: Good  Observed Behavior: Cooperative  Sexual Misconduct History: Current - no  Preception: [place] to person, [place] to time, [place] to place, [place] to situation  Attention:Normal: Yes  Attention: Distractible  Thought Processes: Circumstantial  Thought Content:Normal: No  Thought Content: Preoccupations  Depression Symptoms: Change in energy level, Impaired concentration  Anxiety Symptoms: Generalized  Mony Symptoms: No problems reported or observed. Hallucinations: None  Delusions: No  Delusions:  Other (comment) (n/a)  Memory:Normal: Yes  Memory: Other (comment) (wnl)  Insight and Judgment: No  Insight and Judgment: Poor insight    Tobacco Screening:  Practical Counseling, on admission, ilya X, if applicable and completed (first 3 are required if patient doesn't refuse):            (X) Recognizing danger situations (included triggers and roadblocks)                    (X) Coping skills (new ways to manage stress,relaxation techniques, changing routine, distraction)                                                           (X) Basic information about quitting (benefits of quitting, techniques in how to quit, available resources  ( ) Referral for counseling faxed to [facility]                                                                                                                   ( ) Patient refused counseling  ( ) Patient refused referral  ( ) Patient refused prescription upon discharge  ( ) Patient has not smoked in the last 30 days    Metabolic Screening:    Lab Results   Component Value Date    LABA1C 5.6 01/19/2023       Lab Results   Component Value Date    CHOL 185 01/19/2023     Lab Results   Component Value Date    TRIG 75 01/19/2023     Lab Results   Component Value Date    HDL 49 01/19/2023     No components found for: [facility] EVALUATION AND TREATMENT [place]  Lab Results   Component Value Date    LABVLDL 15 01/19/2023       Carin Perez RN    Bridge Appointment completed: Reviewed Discharge Instructions with patient. Patient verbalizes understanding and agreement with the discharge plan using the teachback method.      Vaccinations (ilya X if applicable and completed):  ( ) Patient states already received influenza vaccine elsewhere  ( ) Patient received influenza vaccine during this hospitalization  (X) Patient refused influenza vaccine at this time

## 2023-01-23 NOTE — GROUP NOTE
Group Therapy Note    Date: 1/22/2023    Group Start Time: 1296  Group End Time: 2000  Group Topic: [address]        Group Therapy Note    Attendees: 10       Patient's Goal:  No goal.    Notes:  Pt reported that he felt anxious today and utilized coping skill power walking. Pt stayed for the full duration of group. Status After Intervention:  Improved    Participation Level:  Active Listener and Interactive    Participation Quality: Appropriate, Attentive, Sharing, and Supportive      Speech:  normal      Thought Process/Content: Logical      Affective Functioning: Flat      Mood: euthymic      Level of consciousness:  Alert, Oriented x4, and Attentive      Response to Learning: Able to verbalize current knowledge/experience      Endings: None Reported    Modes of Intervention: Socialization      Discipline Responsible: /Counselor      Signature:  ELINOR Hardy Finnish

## 2023-08-05 ENCOUNTER — HOSPITAL ENCOUNTER (EMERGENCY)
Age: 58
Discharge: HOME | End: 2023-08-05
Attending: STUDENT IN AN ORGANIZED HEALTH CARE EDUCATION/TRAINING PROGRAM
Payer: MEDICAID

## 2023-08-05 VITALS
HEART RATE: 61 BPM | OXYGEN SATURATION: 96 % | TEMPERATURE: 98.2 F | SYSTOLIC BLOOD PRESSURE: 113 MMHG | RESPIRATION RATE: 23 BRPM | DIASTOLIC BLOOD PRESSURE: 83 MMHG

## 2023-08-05 DIAGNOSIS — M79.89 LEG SWELLING: Primary | ICD-10-CM

## 2023-08-05 LAB
ALBUMIN SERPL-MCNC: 4.1 G/DL (ref 3.4–5)
ALBUMIN/GLOB SERPL: 1.3 {RATIO} (ref 1.1–2.2)
ALP SERPL-CCNC: 95 U/L (ref 40–129)
ALT SERPL-CCNC: 19 U/L (ref 10–40)
ANION GAP SERPL CALCULATED.3IONS-SCNC: 9 MMOL/L (ref 3–16)
AST SERPL-CCNC: 18 U/L (ref 15–37)
BASOPHILS # BLD: 0 K/UL (ref 0–0.2)
BASOPHILS NFR BLD: 0.6 %
BILIRUB SERPL-MCNC: <0.2 MG/DL (ref 0–1)
BUN SERPL-MCNC: 15 MG/DL (ref 7–20)
CALCIUM SERPL-MCNC: 9.9 MG/DL (ref 8.3–10.6)
CHLORIDE SERPL-SCNC: 102 MMOL/L (ref 99–110)
CO2 SERPL-SCNC: 27 MMOL/L (ref 21–32)
CREAT SERPL-MCNC: 1 MG/DL (ref 0.9–1.3)
D DIMER: 0.58 UG/ML FEU (ref 0–0.6)
DEPRECATED RDW RBC AUTO: 14.1 % (ref 12.4–15.4)
EOSINOPHIL # BLD: 0.2 K/UL (ref 0–0.6)
EOSINOPHIL NFR BLD: 2.5 %
GFR SERPLBLD CREATININE-BSD FMLA CKD-EPI: >60 ML/MIN/{1.73_M2}
GLUCOSE SERPL-MCNC: 94 MG/DL (ref 70–99)
HCT VFR BLD AUTO: 41.5 % (ref 40.5–52.5)
HGB BLD-MCNC: 14 G/DL (ref 13.5–17.5)
LYMPHOCYTES # BLD: 2.8 K/UL (ref 1–5.1)
LYMPHOCYTES NFR BLD: 36 %
MCH RBC QN AUTO: 29 PG (ref 26–34)
MCHC RBC AUTO-ENTMCNC: 33.7 G/DL (ref 31–36)
MCV RBC AUTO: 86 FL (ref 80–100)
MONOCYTES # BLD: 0.7 K/UL (ref 0–1.3)
MONOCYTES NFR BLD: 9.2 %
NEUTROPHILS # BLD: 4.1 K/UL (ref 1.7–7.7)
NEUTROPHILS NFR BLD: 51.7 %
NT-PROBNP SERPL-MCNC: 145 PG/ML (ref 0–124)
PLATELET # BLD AUTO: 245 K/UL (ref 135–450)
PMV BLD AUTO: 7.5 FL (ref 5–10.5)
POTASSIUM SERPL-SCNC: 4.6 MMOL/L (ref 3.5–5.1)
PROT SERPL-MCNC: 7.3 G/DL (ref 6.4–8.2)
RBC # BLD AUTO: 4.82 M/UL (ref 4.2–5.9)
SODIUM SERPL-SCNC: 138 MMOL/L (ref 136–145)
WBC # BLD AUTO: 7.9 K/UL (ref 4–11)

## 2023-08-05 PROCEDURE — 93005 ELECTROCARDIOGRAM TRACING: CPT | Performed by: STUDENT IN AN ORGANIZED HEALTH CARE EDUCATION/TRAINING PROGRAM

## 2023-08-05 PROCEDURE — 85379 FIBRIN DEGRADATION QUANT: CPT

## 2023-08-05 PROCEDURE — 83880 ASSAY OF NATRIURETIC PEPTIDE: CPT

## 2023-08-05 PROCEDURE — 99283 EMERGENCY DEPT VISIT LOW MDM: CPT

## 2023-08-05 PROCEDURE — 36415 COLL VENOUS BLD VENIPUNCTURE: CPT

## 2023-08-05 PROCEDURE — 80053 COMPREHEN METABOLIC PANEL: CPT

## 2023-08-05 PROCEDURE — 85025 COMPLETE CBC W/AUTO DIFF WBC: CPT

## 2023-08-05 RX ORDER — RIVAROXABAN 15 MG-20MG
KIT ORAL
COMMUNITY
Start: 2023-08-04

## 2023-08-05 RX ORDER — PREDNISONE 10 MG/1
TABLET ORAL
COMMUNITY
Start: 2023-08-02

## 2023-08-05 RX ORDER — MIRTAZAPINE 15 MG/1
TABLET, FILM COATED ORAL
COMMUNITY
Start: 2023-04-29

## 2023-08-05 RX ORDER — ATORVASTATIN CALCIUM 20 MG/1
20 TABLET, FILM COATED ORAL DAILY
COMMUNITY
Start: 2023-06-21

## 2023-08-05 RX ORDER — METHOCARBAMOL 750 MG/1
TABLET, FILM COATED ORAL
COMMUNITY
Start: 2023-08-02

## 2023-08-05 RX ORDER — HYDROXYZINE HYDROCHLORIDE 25 MG/1
TABLET, FILM COATED ORAL EVERY 8 HOURS
COMMUNITY

## 2023-08-05 RX ORDER — ASPIRIN 81 MG/1
81 TABLET, CHEWABLE ORAL DAILY
COMMUNITY
Start: 2023-06-21

## 2023-08-05 RX ORDER — POLYETHYLENE GLYCOL 3350 17 G/17G
POWDER, FOR SOLUTION ORAL
COMMUNITY
Start: 2023-08-02

## 2023-08-05 RX ORDER — BUTALBITAL, ACETAMINOPHEN AND CAFFEINE 50; 325; 40 MG/1; MG/1; MG/1
1 TABLET ORAL EVERY 4 HOURS PRN
COMMUNITY

## 2023-08-05 ASSESSMENT — PAIN - FUNCTIONAL ASSESSMENT: PAIN_FUNCTIONAL_ASSESSMENT: NONE - DENIES PAIN

## 2023-08-05 NOTE — ED PROVIDER NOTES
Emergency Department Provider Note  Location: [address] [place] [address] ED  8/5/2023     Patient Identification  Yoko Weems is a 61 y.o. male    Chief Complaint  Abnormal Lab (Reports saw pcp for left leg swelling on Thursday. Reports a wellness check was sent to him today as office could not get in contact with him. Pt does not know which lab was abnormal. )      Mode of Arrival  private car    HPI  (History provided by patient)  This is a 61 y.o. male with a PMH significant for depression  presented today for abnormal lab. Patient reports that he saw his PCP for left leg swelling on Thursday. Their office attempted to do a wellness check but cannot get in contact with him. Patient is unsure of which lab was abnormal.  He denies any chest pain or shortness of breath. He denies any nausea or vomiting. Denies abdominal pain. He denies any significant pain in the left lower extremity. ROS  Review of Systems   All other systems reviewed and are negative. I have reviewed the following nursing documentation:  Allergies: No Known Allergies    Past medical history:  has a past medical history of Major depression, recurrent ([address]) (1/19/2023). Past surgical history:  has a past surgical history that includes Stomach surgery. Home medications:   Prior to Admission medications    Medication Sig Start Date End Date Taking? Authorizing Provider   polyethylene glycol (GLYCOLAX) 17 GM/SCOOP powder Mix 1 capful in 8oz drink twice daily as needed for constipation, taper as needed to keep bowels soft and moving 8/2/23  Yes Historical Provider, MD   rivaroxaban (XARELTO STARTER PACK) 15 & 20 MG Starter Pack Per package instructions.  PLEASE CALL OFFICE 8/4/23  Yes Historical Provider, MD   aspirin 81 MG chewable tablet Take 1 tablet by mouth daily 6/21/23   Historical Provider, MD   atorvastatin (LIPITOR) 20 MG tablet Take 1 tablet by mouth daily 6/21/23   Historical Provider, MD

## 2023-08-06 LAB
EKG ATRIAL RATE: 64 BPM
EKG DIAGNOSIS: NORMAL
EKG P AXIS: 66 DEGREES
EKG P-R INTERVAL: 158 MS
EKG Q-T INTERVAL: 380 MS
EKG QRS DURATION: 88 MS
EKG QTC CALCULATION (BAZETT): 392 MS
EKG R AXIS: 67 DEGREES
EKG T AXIS: 63 DEGREES
EKG VENTRICULAR RATE: 64 BPM

## 2023-08-06 PROCEDURE — 93010 ELECTROCARDIOGRAM REPORT: CPT | Performed by: INTERNAL MEDICINE

## 2023-08-28 ENCOUNTER — HOSPITAL ENCOUNTER (OUTPATIENT)
Dept: ULTRASOUND IMAGING | Age: 58
Discharge: HOME | End: 2023-08-28
Payer: MEDICAID

## 2023-08-28 DIAGNOSIS — R10.11 ABDOMINAL PAIN, RIGHT UPPER QUADRANT: ICD-10-CM

## 2023-08-28 PROCEDURE — 76700 US EXAM ABDOM COMPLETE: CPT

## 2023-10-04 ENCOUNTER — HOSPITAL ENCOUNTER (OUTPATIENT)
Dept: VASCULAR LAB | Age: 58
Discharge: HOME | End: 2023-10-04
Payer: MEDICAID

## 2023-10-04 DIAGNOSIS — M79.89 SWELLING OF LEFT LOWER EXTREMITY: ICD-10-CM

## 2023-10-04 PROCEDURE — 93971 EXTREMITY STUDY: CPT

## 2023-11-06 ENCOUNTER — HOSPITAL ENCOUNTER (EMERGENCY)
Age: 58
Discharge: HOME | End: 2023-11-06
Attending: EMERGENCY MEDICINE
Payer: MEDICAID

## 2023-11-06 VITALS
OXYGEN SATURATION: 99 % | TEMPERATURE: 98.3 F | HEIGHT: 69 IN | WEIGHT: 200 LBS | SYSTOLIC BLOOD PRESSURE: 169 MMHG | BODY MASS INDEX: 29.62 KG/M2 | HEART RATE: 98 BPM | RESPIRATION RATE: 16 BRPM | DIASTOLIC BLOOD PRESSURE: 102 MMHG

## 2023-11-06 DIAGNOSIS — H00.014 HORDEOLUM OF LEFT UPPER EYELID, UNSPECIFIED HORDEOLUM TYPE: Primary | ICD-10-CM

## 2023-11-06 PROCEDURE — 6370000000 HC RX 637 (ALT 250 FOR IP): Performed by: EMERGENCY MEDICINE

## 2023-11-06 PROCEDURE — 99283 EMERGENCY DEPT VISIT LOW MDM: CPT

## 2023-11-06 RX ORDER — ERYTHROMYCIN 5 MG/G
OINTMENT OPHTHALMIC ONCE
Status: COMPLETED | OUTPATIENT
Start: 2023-11-06 | End: 2023-11-06

## 2023-11-06 RX ORDER — ERYTHROMYCIN 5 MG/G
OINTMENT OPHTHALMIC
Qty: 1 G | Refills: 0 | Status: SHIPPED | OUTPATIENT
Start: 2023-11-06 | End: 2023-11-13

## 2023-11-06 RX ORDER — ERYTHROMYCIN 5 MG/G
OINTMENT OPHTHALMIC
Qty: 1 G | Refills: 0 | Status: SHIPPED | OUTPATIENT
Start: 2023-11-06 | End: 2023-11-06 | Stop reason: SDUPTHER

## 2023-11-06 RX ADMIN — ERYTHROMYCIN: 5 OINTMENT OPHTHALMIC at 20:05

## 2023-11-06 ASSESSMENT — PAIN - FUNCTIONAL ASSESSMENT: PAIN_FUNCTIONAL_ASSESSMENT: NONE - DENIES PAIN
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
## 2023-11-07 NOTE — DISCHARGE INSTRUCTIONS
Please take your medication as prescribed.   If your symptoms worsen despite treatment please come back to the ER for repeat evaluation

## 2023-12-09 ENCOUNTER — APPOINTMENT (OUTPATIENT)
Dept: GENERAL RADIOLOGY | Age: 58
End: 2023-12-09
Payer: MEDICAID

## 2023-12-09 ENCOUNTER — HOSPITAL ENCOUNTER (EMERGENCY)
Age: 58
Discharge: HOME | End: 2023-12-09
Attending: EMERGENCY MEDICINE
Payer: MEDICAID

## 2023-12-09 VITALS
SYSTOLIC BLOOD PRESSURE: 122 MMHG | WEIGHT: 185 LBS | HEIGHT: 69 IN | BODY MASS INDEX: 27.4 KG/M2 | RESPIRATION RATE: 16 BRPM | OXYGEN SATURATION: 98 % | TEMPERATURE: 98.1 F | HEART RATE: 70 BPM | DIASTOLIC BLOOD PRESSURE: 81 MMHG

## 2023-12-09 DIAGNOSIS — R05.1 ACUTE COUGH: ICD-10-CM

## 2023-12-09 DIAGNOSIS — J01.90 ACUTE NON-RECURRENT SINUSITIS, UNSPECIFIED LOCATION: Primary | ICD-10-CM

## 2023-12-09 LAB
FLUAV RNA UPPER RESP QL NAA+PROBE: NEGATIVE
FLUBV AG NPH QL: NEGATIVE
SARS-COV-2 RDRP RESP QL NAA+PROBE: NOT DETECTED

## 2023-12-09 PROCEDURE — 87804 INFLUENZA ASSAY W/OPTIC: CPT

## 2023-12-09 PROCEDURE — 6370000000 HC RX 637 (ALT 250 FOR IP): Performed by: EMERGENCY MEDICINE

## 2023-12-09 PROCEDURE — 87635 SARS-COV-2 COVID-19 AMP PRB: CPT

## 2023-12-09 PROCEDURE — 71045 X-RAY EXAM CHEST 1 VIEW: CPT

## 2023-12-09 PROCEDURE — 99284 EMERGENCY DEPT VISIT MOD MDM: CPT

## 2023-12-09 RX ORDER — PREDNISONE 50 MG/1
50 TABLET ORAL DAILY
Qty: 5 TABLET | Refills: 0 | Status: SHIPPED | OUTPATIENT
Start: 2023-12-09 | End: 2023-12-14

## 2023-12-09 RX ORDER — AZITHROMYCIN 250 MG/1
250 TABLET, FILM COATED ORAL SEE ADMIN INSTRUCTIONS
Qty: 6 TABLET | Refills: 0 | Status: SHIPPED | OUTPATIENT
Start: 2023-12-09 | End: 2023-12-14

## 2023-12-09 RX ORDER — ALBUTEROL SULFATE 90 UG/1
2 AEROSOL, METERED RESPIRATORY (INHALATION) 4 TIMES DAILY PRN
Qty: 18 G | Refills: 0 | Status: SHIPPED | OUTPATIENT
Start: 2023-12-09

## 2023-12-09 RX ORDER — IPRATROPIUM BROMIDE AND ALBUTEROL SULFATE 2.5; .5 MG/3ML; MG/3ML
1 SOLUTION RESPIRATORY (INHALATION)
Status: DISCONTINUED | OUTPATIENT
Start: 2023-12-09 | End: 2023-12-09 | Stop reason: HOSPADM

## 2023-12-09 RX ADMIN — IPRATROPIUM BROMIDE AND ALBUTEROL SULFATE 1 DOSE: .5; 2.5 SOLUTION RESPIRATORY (INHALATION) at 11:35

## 2023-12-09 ASSESSMENT — PAIN - FUNCTIONAL ASSESSMENT: PAIN_FUNCTIONAL_ASSESSMENT: NONE - DENIES PAIN

## 2023-12-09 NOTE — ED NOTES
Reviewed patient discharge instructions at this time, copy given to patient. No questions or concerns. Patient voiced understanding.         Shireen Romano  12/09/23 [id number]

## 2025-01-03 ENCOUNTER — APPOINTMENT (OUTPATIENT)
Dept: CT IMAGING | Age: 60
DRG: 198 | End: 2025-01-03
Payer: MEDICAID

## 2025-01-03 ENCOUNTER — APPOINTMENT (OUTPATIENT)
Dept: GENERAL RADIOLOGY | Age: 60
DRG: 198 | End: 2025-01-03
Payer: MEDICAID

## 2025-01-03 ENCOUNTER — APPOINTMENT (OUTPATIENT)
Dept: MRI IMAGING | Age: 60
DRG: 198 | End: 2025-01-03
Payer: MEDICAID

## 2025-01-03 ENCOUNTER — HOSPITAL ENCOUNTER (INPATIENT)
Age: 60
LOS: 2 days | Discharge: ANOTHER HOSPITAL | DRG: 198 | End: 2025-01-05
Attending: EMERGENCY MEDICINE | Admitting: INTERNAL MEDICINE
Payer: MEDICAID

## 2025-01-03 DIAGNOSIS — R07.9 CHEST PAIN, UNSPECIFIED TYPE: Primary | ICD-10-CM

## 2025-01-03 DIAGNOSIS — I63.9 CEREBROVASCULAR ACCIDENT (CVA), UNSPECIFIED MECHANISM (HCC): ICD-10-CM

## 2025-01-03 DIAGNOSIS — R20.0 LEFT SIDED NUMBNESS: ICD-10-CM

## 2025-01-03 PROBLEM — R20.2 NUMBNESS AND TINGLING OF LEFT LOWER EXTREMITY: Status: ACTIVE | Noted: 2025-01-03

## 2025-01-03 LAB
ALBUMIN SERPL-MCNC: 3.7 G/DL (ref 3.4–5)
ALBUMIN/GLOB SERPL: 1.1 {RATIO} (ref 1.1–2.2)
ALP SERPL-CCNC: 92 U/L (ref 40–129)
ALT SERPL-CCNC: 11 U/L (ref 10–40)
ANION GAP SERPL CALCULATED.3IONS-SCNC: 9 MMOL/L (ref 3–16)
AST SERPL-CCNC: 19 U/L (ref 15–37)
BASOPHILS # BLD: 0.1 K/UL (ref 0–0.2)
BASOPHILS NFR BLD: 0.9 %
BILIRUB SERPL-MCNC: <0.2 MG/DL (ref 0–1)
BUN SERPL-MCNC: 21 MG/DL (ref 7–20)
CALCIUM SERPL-MCNC: 8.9 MG/DL (ref 8.3–10.6)
CHLORIDE SERPL-SCNC: 102 MMOL/L (ref 99–110)
CO2 SERPL-SCNC: 27 MMOL/L (ref 21–32)
CREAT SERPL-MCNC: 1 MG/DL (ref 0.8–1.3)
D-DIMER QUANTITATIVE: 0.37 UG/ML FEU (ref 0–0.6)
DEPRECATED RDW RBC AUTO: 13.2 % (ref 12.4–15.4)
EKG ATRIAL RATE: 72 BPM
EKG DIAGNOSIS: NORMAL
EKG P AXIS: 73 DEGREES
EKG P-R INTERVAL: 152 MS
EKG Q-T INTERVAL: 362 MS
EKG QRS DURATION: 78 MS
EKG QTC CALCULATION (BAZETT): 396 MS
EKG R AXIS: 77 DEGREES
EKG T AXIS: 78 DEGREES
EKG VENTRICULAR RATE: 72 BPM
EOSINOPHIL # BLD: 0.1 K/UL (ref 0–0.6)
EOSINOPHIL NFR BLD: 2.1 %
FLUAV RNA RESP QL NAA+PROBE: NOT DETECTED
FLUBV RNA RESP QL NAA+PROBE: NOT DETECTED
GFR SERPLBLD CREATININE-BSD FMLA CKD-EPI: 86 ML/MIN/{1.73_M2}
GLUCOSE BLD-MCNC: 92 MG/DL (ref 70–99)
GLUCOSE SERPL-MCNC: 94 MG/DL (ref 70–99)
HCT VFR BLD AUTO: 42.1 % (ref 40.5–52.5)
HGB BLD-MCNC: 14.6 G/DL (ref 13.5–17.5)
INR PPP: 0.93 (ref 0.85–1.15)
LYMPHOCYTES # BLD: 1.8 K/UL (ref 1–5.1)
LYMPHOCYTES NFR BLD: 28.1 %
MCH RBC QN AUTO: 30.9 PG (ref 26–34)
MCHC RBC AUTO-ENTMCNC: 34.6 G/DL (ref 31–36)
MCV RBC AUTO: 89.2 FL (ref 80–100)
MONOCYTES # BLD: 0.6 K/UL (ref 0–1.3)
MONOCYTES NFR BLD: 9.4 %
NEUTROPHILS # BLD: 3.9 K/UL (ref 1.7–7.7)
NEUTROPHILS NFR BLD: 59.5 %
PERFORMED ON: NORMAL
PLATELET # BLD AUTO: 254 K/UL (ref 135–450)
PMV BLD AUTO: 7.6 FL (ref 5–10.5)
POTASSIUM SERPL-SCNC: 4 MMOL/L (ref 3.5–5.1)
PROT SERPL-MCNC: 7.1 G/DL (ref 6.4–8.2)
PROTHROMBIN TIME: 12.7 SEC (ref 11.9–14.9)
RBC # BLD AUTO: 4.71 M/UL (ref 4.2–5.9)
SARS-COV-2 RNA RESP QL NAA+PROBE: NOT DETECTED
SODIUM SERPL-SCNC: 138 MMOL/L (ref 136–145)
TROPONIN, HIGH SENSITIVITY: 11 NG/L (ref 0–22)
TROPONIN, HIGH SENSITIVITY: 11 NG/L (ref 0–22)
TROPONIN, HIGH SENSITIVITY: 9 NG/L (ref 0–22)
WBC # BLD AUTO: 6.6 K/UL (ref 4–11)

## 2025-01-03 PROCEDURE — 71045 X-RAY EXAM CHEST 1 VIEW: CPT

## 2025-01-03 PROCEDURE — 87636 SARSCOV2 & INF A&B AMP PRB: CPT

## 2025-01-03 PROCEDURE — 6370000000 HC RX 637 (ALT 250 FOR IP)

## 2025-01-03 PROCEDURE — 85025 COMPLETE CBC W/AUTO DIFF WBC: CPT

## 2025-01-03 PROCEDURE — 70450 CT HEAD/BRAIN W/O DYE: CPT

## 2025-01-03 PROCEDURE — 84484 ASSAY OF TROPONIN QUANT: CPT

## 2025-01-03 PROCEDURE — 70551 MRI BRAIN STEM W/O DYE: CPT

## 2025-01-03 PROCEDURE — 36415 COLL VENOUS BLD VENIPUNCTURE: CPT

## 2025-01-03 PROCEDURE — 99285 EMERGENCY DEPT VISIT HI MDM: CPT

## 2025-01-03 PROCEDURE — 74018 RADEX ABDOMEN 1 VIEW: CPT

## 2025-01-03 PROCEDURE — 72131 CT LUMBAR SPINE W/O DYE: CPT

## 2025-01-03 PROCEDURE — 99222 1ST HOSP IP/OBS MODERATE 55: CPT

## 2025-01-03 PROCEDURE — 85610 PROTHROMBIN TIME: CPT

## 2025-01-03 PROCEDURE — 82607 VITAMIN B-12: CPT

## 2025-01-03 PROCEDURE — 93005 ELECTROCARDIOGRAM TRACING: CPT | Performed by: EMERGENCY MEDICINE

## 2025-01-03 PROCEDURE — 2500000003 HC RX 250 WO HCPCS

## 2025-01-03 PROCEDURE — 82746 ASSAY OF FOLIC ACID SERUM: CPT

## 2025-01-03 PROCEDURE — 94761 N-INVAS EAR/PLS OXIMETRY MLT: CPT

## 2025-01-03 PROCEDURE — 80053 COMPREHEN METABOLIC PANEL: CPT

## 2025-01-03 PROCEDURE — 6360000002 HC RX W HCPCS

## 2025-01-03 PROCEDURE — 70496 CT ANGIOGRAPHY HEAD: CPT

## 2025-01-03 PROCEDURE — 93010 ELECTROCARDIOGRAM REPORT: CPT | Performed by: INTERNAL MEDICINE

## 2025-01-03 PROCEDURE — 85379 FIBRIN DEGRADATION QUANT: CPT

## 2025-01-03 PROCEDURE — 2060000000 HC ICU INTERMEDIATE R&B

## 2025-01-03 PROCEDURE — 6370000000 HC RX 637 (ALT 250 FOR IP): Performed by: EMERGENCY MEDICINE

## 2025-01-03 PROCEDURE — 6360000004 HC RX CONTRAST MEDICATION: Performed by: EMERGENCY MEDICINE

## 2025-01-03 RX ORDER — ENOXAPARIN SODIUM 100 MG/ML
40 INJECTION SUBCUTANEOUS DAILY
Status: DISCONTINUED | OUTPATIENT
Start: 2025-01-03 | End: 2025-01-05 | Stop reason: HOSPADM

## 2025-01-03 RX ORDER — POLYETHYLENE GLYCOL 3350 17 G/17G
17 POWDER, FOR SOLUTION ORAL DAILY PRN
Status: DISCONTINUED | OUTPATIENT
Start: 2025-01-03 | End: 2025-01-05 | Stop reason: HOSPADM

## 2025-01-03 RX ORDER — ALBUTEROL SULFATE 90 UG/1
2 INHALANT RESPIRATORY (INHALATION) EVERY 4 HOURS PRN
Status: DISCONTINUED | OUTPATIENT
Start: 2025-01-03 | End: 2025-01-05 | Stop reason: HOSPADM

## 2025-01-03 RX ORDER — PANTOPRAZOLE SODIUM 40 MG/1
40 TABLET, DELAYED RELEASE ORAL
Status: DISCONTINUED | OUTPATIENT
Start: 2025-01-04 | End: 2025-01-05 | Stop reason: HOSPADM

## 2025-01-03 RX ORDER — ASPIRIN 325 MG
325 TABLET ORAL ONCE
Status: COMPLETED | OUTPATIENT
Start: 2025-01-03 | End: 2025-01-03

## 2025-01-03 RX ORDER — ONDANSETRON 4 MG/1
4 TABLET, ORALLY DISINTEGRATING ORAL EVERY 8 HOURS PRN
Status: DISCONTINUED | OUTPATIENT
Start: 2025-01-03 | End: 2025-01-05 | Stop reason: HOSPADM

## 2025-01-03 RX ORDER — ASPIRIN 300 MG/1
300 SUPPOSITORY RECTAL DAILY
Status: DISCONTINUED | OUTPATIENT
Start: 2025-01-03 | End: 2025-01-05 | Stop reason: HOSPADM

## 2025-01-03 RX ORDER — ONDANSETRON 2 MG/ML
4 INJECTION INTRAMUSCULAR; INTRAVENOUS EVERY 6 HOURS PRN
Status: DISCONTINUED | OUTPATIENT
Start: 2025-01-03 | End: 2025-01-05 | Stop reason: HOSPADM

## 2025-01-03 RX ORDER — SODIUM CHLORIDE 9 MG/ML
INJECTION, SOLUTION INTRAVENOUS PRN
Status: DISCONTINUED | OUTPATIENT
Start: 2025-01-03 | End: 2025-01-05 | Stop reason: HOSPADM

## 2025-01-03 RX ORDER — ALBUTEROL SULFATE 90 UG/1
2 INHALANT RESPIRATORY (INHALATION) 4 TIMES DAILY PRN
Status: DISCONTINUED | OUTPATIENT
Start: 2025-01-03 | End: 2025-01-03

## 2025-01-03 RX ORDER — LABETALOL HYDROCHLORIDE 5 MG/ML
10 INJECTION, SOLUTION INTRAVENOUS EVERY 10 MIN PRN
Status: DISCONTINUED | OUTPATIENT
Start: 2025-01-03 | End: 2025-01-05 | Stop reason: HOSPADM

## 2025-01-03 RX ORDER — REGADENOSON 0.08 MG/ML
0.4 INJECTION, SOLUTION INTRAVENOUS
Status: COMPLETED | OUTPATIENT
Start: 2025-01-03 | End: 2025-01-04

## 2025-01-03 RX ORDER — ASPIRIN 81 MG/1
81 TABLET, CHEWABLE ORAL DAILY
Status: DISCONTINUED | OUTPATIENT
Start: 2025-01-03 | End: 2025-01-05 | Stop reason: HOSPADM

## 2025-01-03 RX ORDER — IOPAMIDOL 755 MG/ML
75 INJECTION, SOLUTION INTRAVASCULAR
Status: COMPLETED | OUTPATIENT
Start: 2025-01-03 | End: 2025-01-03

## 2025-01-03 RX ORDER — SODIUM CHLORIDE 0.9 % (FLUSH) 0.9 %
5-40 SYRINGE (ML) INJECTION EVERY 12 HOURS SCHEDULED
Status: DISCONTINUED | OUTPATIENT
Start: 2025-01-03 | End: 2025-01-05 | Stop reason: HOSPADM

## 2025-01-03 RX ORDER — SODIUM CHLORIDE 0.9 % (FLUSH) 0.9 %
5-40 SYRINGE (ML) INJECTION PRN
Status: DISCONTINUED | OUTPATIENT
Start: 2025-01-03 | End: 2025-01-05 | Stop reason: HOSPADM

## 2025-01-03 RX ORDER — ROSUVASTATIN CALCIUM 10 MG/1
40 TABLET, COATED ORAL NIGHTLY
Status: DISCONTINUED | OUTPATIENT
Start: 2025-01-03 | End: 2025-01-05 | Stop reason: HOSPADM

## 2025-01-03 RX ADMIN — ROSUVASTATIN CALCIUM 40 MG: 10 TABLET, FILM COATED ORAL at 21:18

## 2025-01-03 RX ADMIN — Medication 10 ML: at 21:18

## 2025-01-03 RX ADMIN — ASPIRIN 325 MG: 325 TABLET ORAL at 21:18

## 2025-01-03 RX ADMIN — ENOXAPARIN SODIUM 40 MG: 100 INJECTION SUBCUTANEOUS at 21:17

## 2025-01-03 RX ADMIN — IOPAMIDOL 75 ML: 755 INJECTION, SOLUTION INTRAVENOUS at 14:16

## 2025-01-03 ASSESSMENT — PAIN DESCRIPTION - ORIENTATION: ORIENTATION: INNER;MID

## 2025-01-03 ASSESSMENT — PAIN DESCRIPTION - DESCRIPTORS: DESCRIPTORS: TIGHTNESS

## 2025-01-03 ASSESSMENT — PAIN SCALES - GENERAL: PAINLEVEL_OUTOF10: 1

## 2025-01-03 ASSESSMENT — PAIN - FUNCTIONAL ASSESSMENT: PAIN_FUNCTIONAL_ASSESSMENT: 0-10

## 2025-01-03 ASSESSMENT — PAIN DESCRIPTION - LOCATION: LOCATION: CHEST

## 2025-01-03 NOTE — FLOWSHEET NOTE
Patient admitted to 322 from ED. Patient A&Ox4, VSS. Patient denies chest pain but endorses tightness that is easing from what it was. Patient able to complete MRI screening and partial admission before being taken for MRI.
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
## 2025-01-03 NOTE — ED PROVIDER NOTES
ED Attending Attestation Note    I personally saw the patient and made/approved the management plan and take responsibility for patient management.     Briefly, 59 y.o. male presents with chest pain.  Primary complaint was chest pain but during history after initial arrival and assessment of his cardiac status, patient states that he has been having headaches off and on for the past 3 days and then today about an hour prior to arrival he started having numbness in the left leg distal to the knee as well as the entire left arm.  That is new for him..     Focused exam:   Gen: 59 y.o. male, NAD  Errol RAMIRES Ngo's NIH Stroke Scale at 2:02 PM is:  Level of Consciousness:  0 - alert; keenly responsive    LOC Questions:  0 - answers both questions correctly    LOC Commands:  0 - performs both tasks correctly    Best Gaze:  0 - normal    Visual Fields:  0 - no visual loss    Facial Palsy:  0 - normal symmetric movement    Motor-Arm-Left:  0 - no drift, limb holds 90 (or 45) degrees for full 10 seconds    Motor-Leg-Left:  0 - no drift; leg holds 30 degree position for full 5 seconds    Motor-Arm-Right:  0 - no drift, limb holds 90 (or 45) degrees for full 10 seconds    Motor-Leg-Right:  0 - no drift; leg holds 30 degree position for full 5 seconds    Limb Ataxia:  0 - absent    Sensory:  1 - mild to moderate sensory loss; patient feels pinprick is less sharp or is dull on the affected side; there is a loss of superficial pain with pinprick but patient is aware of being touched     Best Language:  0 - no aphasia, normal    Dysarthria:  0 - normal    Extinction and Inattention:  0 - no abnormality      Imaging:   CT HEAD WO CONTRAST    (Results Pending)   CTA HEAD NECK W CONTRAST    (Results Pending)      The Ekg interpreted by me shows  normal sinus rhythm with a rate of 72  Axis is   Normal  QTc is  normal  Intervals and Durations are unremarkable.      ST Segments: nonspecific changes  No significant change from prior EKG

## 2025-01-03 NOTE — ED NOTES
1401 - Called  for Code Stroke.    1402 - Called CT, CT is ready.     1406 -  returned call for Code Stroke. Call given to Dr. Quinteros.
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
## 2025-01-04 ENCOUNTER — APPOINTMENT (OUTPATIENT)
Dept: NUCLEAR MEDICINE | Age: 60
DRG: 198 | End: 2025-01-04
Payer: MEDICAID

## 2025-01-04 ENCOUNTER — APPOINTMENT (OUTPATIENT)
Age: 60
DRG: 198 | End: 2025-01-04
Payer: MEDICAID

## 2025-01-04 LAB
ANION GAP SERPL CALCULATED.3IONS-SCNC: 7 MMOL/L (ref 3–16)
BASOPHILS # BLD: 0 K/UL (ref 0–0.2)
BASOPHILS NFR BLD: 0.7 %
BUN SERPL-MCNC: 19 MG/DL (ref 7–20)
CALCIUM SERPL-MCNC: 8.9 MG/DL (ref 8.3–10.6)
CHLORIDE SERPL-SCNC: 105 MMOL/L (ref 99–110)
CHOLEST SERPL-MCNC: 192 MG/DL (ref 0–199)
CO2 SERPL-SCNC: 28 MMOL/L (ref 21–32)
CREAT SERPL-MCNC: 1.1 MG/DL (ref 0.8–1.3)
DEPRECATED RDW RBC AUTO: 13.3 % (ref 12.4–15.4)
ECHO AO ROOT DIAM: 3.7 CM
ECHO AO ROOT INDEX: 1.89 CM/M2
ECHO AV CUSP MM: 1.8 CM
ECHO AV PEAK GRADIENT: 5 MMHG
ECHO AV PEAK VELOCITY: 1.1 M/S
ECHO BSA: 1.97 M2
ECHO BSA: 1.97 M2
ECHO LA DIAMETER INDEX: 1.48 CM/M2
ECHO LA DIAMETER: 2.9 CM
ECHO LA TO AORTIC ROOT RATIO: 0.78
ECHO LV E' LATERAL VELOCITY: 13.3 CM/S
ECHO LV E' SEPTAL VELOCITY: 9.79 CM/S
ECHO LV EJECTION FRACTION BIPLANE: 55 % (ref 55–100)
ECHO LV FRACTIONAL SHORTENING: 45 % (ref 28–44)
ECHO LV INTERNAL DIMENSION DIASTOLE INDEX: 2.4 CM/M2
ECHO LV INTERNAL DIMENSION DIASTOLIC: 4.7 CM (ref 4.2–5.9)
ECHO LV INTERNAL DIMENSION SYSTOLIC INDEX: 1.33 CM/M2
ECHO LV INTERNAL DIMENSION SYSTOLIC: 2.6 CM
ECHO LV ISOVOLUMETRIC RELAXATION TIME (IVRT): 74 MS
ECHO LV IVSD: 0.7 CM (ref 0.6–1)
ECHO LV MASS 2D: 112.5 G (ref 88–224)
ECHO LV MASS INDEX 2D: 57.4 G/M2 (ref 49–115)
ECHO LV POSTERIOR WALL DIASTOLIC: 0.8 CM (ref 0.6–1)
ECHO LV RELATIVE WALL THICKNESS RATIO: 0.34
ECHO MV A VELOCITY: 0.74 M/S
ECHO MV E VELOCITY: 0.6 M/S
ECHO MV E/A RATIO: 0.81
ECHO MV E/E' LATERAL: 4.51
ECHO MV E/E' RATIO (AVERAGED): 5.32
ECHO MV E/E' SEPTAL: 6.13
ECHO PV MAX VELOCITY: 1 M/S
ECHO PV PEAK GRADIENT: 4 MMHG
ECHO RV FREE WALL PEAK S': 12.1 CM/S
ECHO RV TAPSE: 2.3 CM (ref 1.7–?)
ECHO TV PEAK GRADIENT: 2 MMHG
EOSINOPHIL # BLD: 0.3 K/UL (ref 0–0.6)
EOSINOPHIL NFR BLD: 6.1 %
FOLATE SERPL-MCNC: 16.6 NG/ML (ref 4.78–24.2)
GFR SERPLBLD CREATININE-BSD FMLA CKD-EPI: 77 ML/MIN/{1.73_M2}
GLUCOSE SERPL-MCNC: 94 MG/DL (ref 70–99)
HCT VFR BLD AUTO: 44.7 % (ref 40.5–52.5)
HDLC SERPL-MCNC: 36 MG/DL (ref 40–60)
HGB BLD-MCNC: 15.1 G/DL (ref 13.5–17.5)
LDLC SERPL CALC-MCNC: 139 MG/DL
LYMPHOCYTES # BLD: 2 K/UL (ref 1–5.1)
LYMPHOCYTES NFR BLD: 42.1 %
MCH RBC QN AUTO: 30.5 PG (ref 26–34)
MCHC RBC AUTO-ENTMCNC: 33.7 G/DL (ref 31–36)
MCV RBC AUTO: 90.3 FL (ref 80–100)
MONOCYTES # BLD: 0.5 K/UL (ref 0–1.3)
MONOCYTES NFR BLD: 10.8 %
NEUTROPHILS # BLD: 1.9 K/UL (ref 1.7–7.7)
NEUTROPHILS NFR BLD: 40.3 %
NUC STRESS EJECTION FRACTION: 51 %
NUC STRESS LV EDV: 89 ML (ref 67–155)
NUC STRESS LV ESV: 44 ML (ref 22–58)
NUC STRESS LV MASS: 120 G
PLATELET # BLD AUTO: 241 K/UL (ref 135–450)
PMV BLD AUTO: 7.8 FL (ref 5–10.5)
POTASSIUM SERPL-SCNC: 4.9 MMOL/L (ref 3.5–5.1)
RBC # BLD AUTO: 4.95 M/UL (ref 4.2–5.9)
SODIUM SERPL-SCNC: 140 MMOL/L (ref 136–145)
STRESS BASELINE DIAS BP: 66 MMHG
STRESS BASELINE HR: 60 BPM
STRESS BASELINE SYS BP: 107 MMHG
STRESS ESTIMATED WORKLOAD: 1 METS
STRESS PEAK DIAS BP: 66 MMHG
STRESS PEAK SYS BP: 107 MMHG
STRESS PERCENT HR ACHIEVED: 60 %
STRESS POST PEAK HR: 96 BPM
STRESS RATE PRESSURE PRODUCT: NORMAL BPM*MMHG
STRESS ST DEPRESSION: 0 MM
STRESS TARGET HR: 160 BPM
TRIGL SERPL-MCNC: 84 MG/DL (ref 0–150)
VIT B12 SERPL-MCNC: 314 PG/ML (ref 211–911)
VLDLC SERPL CALC-MCNC: 17 MG/DL
WBC # BLD AUTO: 4.7 K/UL (ref 4–11)

## 2025-01-04 PROCEDURE — 3430000000 HC RX DIAGNOSTIC RADIOPHARMACEUTICAL

## 2025-01-04 PROCEDURE — 93018 CV STRESS TEST I&R ONLY: CPT | Performed by: INTERNAL MEDICINE

## 2025-01-04 PROCEDURE — 93017 CV STRESS TEST TRACING ONLY: CPT

## 2025-01-04 PROCEDURE — 99232 SBSQ HOSP IP/OBS MODERATE 35: CPT | Performed by: INTERNAL MEDICINE

## 2025-01-04 PROCEDURE — 78452 HT MUSCLE IMAGE SPECT MULT: CPT | Performed by: INTERNAL MEDICINE

## 2025-01-04 PROCEDURE — 83036 HEMOGLOBIN GLYCOSYLATED A1C: CPT

## 2025-01-04 PROCEDURE — 93016 CV STRESS TEST SUPVJ ONLY: CPT | Performed by: INTERNAL MEDICINE

## 2025-01-04 PROCEDURE — 93306 TTE W/DOPPLER COMPLETE: CPT

## 2025-01-04 PROCEDURE — 93306 TTE W/DOPPLER COMPLETE: CPT | Performed by: INTERNAL MEDICINE

## 2025-01-04 PROCEDURE — 6360000002 HC RX W HCPCS

## 2025-01-04 PROCEDURE — 78452 HT MUSCLE IMAGE SPECT MULT: CPT

## 2025-01-04 PROCEDURE — 99223 1ST HOSP IP/OBS HIGH 75: CPT | Performed by: PSYCHIATRY & NEUROLOGY

## 2025-01-04 PROCEDURE — A9502 TC99M TETROFOSMIN: HCPCS

## 2025-01-04 PROCEDURE — 6370000000 HC RX 637 (ALT 250 FOR IP)

## 2025-01-04 PROCEDURE — 80061 LIPID PANEL: CPT

## 2025-01-04 PROCEDURE — 93005 ELECTROCARDIOGRAM TRACING: CPT | Performed by: INTERNAL MEDICINE

## 2025-01-04 PROCEDURE — 85025 COMPLETE CBC W/AUTO DIFF WBC: CPT

## 2025-01-04 PROCEDURE — 2060000000 HC ICU INTERMEDIATE R&B

## 2025-01-04 PROCEDURE — 2500000003 HC RX 250 WO HCPCS

## 2025-01-04 PROCEDURE — 36415 COLL VENOUS BLD VENIPUNCTURE: CPT

## 2025-01-04 PROCEDURE — 80048 BASIC METABOLIC PNL TOTAL CA: CPT

## 2025-01-04 RX ADMIN — ROSUVASTATIN CALCIUM 40 MG: 10 TABLET, FILM COATED ORAL at 20:29

## 2025-01-04 RX ADMIN — REGADENOSON 0.4 MG: 0.08 INJECTION, SOLUTION INTRAVENOUS at 09:05

## 2025-01-04 RX ADMIN — TETROFOSMIN 10.2 MILLICURIE: 1.38 INJECTION, POWDER, LYOPHILIZED, FOR SOLUTION INTRAVENOUS at 07:58

## 2025-01-04 RX ADMIN — TETROFOSMIN 32 MILLICURIE: 1.38 INJECTION, POWDER, LYOPHILIZED, FOR SOLUTION INTRAVENOUS at 09:05

## 2025-01-04 RX ADMIN — Medication 10 ML: at 20:29

## 2025-01-04 RX ADMIN — ONDANSETRON 4 MG: 4 TABLET, ORALLY DISINTEGRATING ORAL at 16:20

## 2025-01-04 NOTE — FLOWSHEET NOTE
01/03/25 2016   Vital Signs   Temp 98.3 °F (36.8 °C)   Temp Source Oral   Pulse 80   Heart Rate Source Monitor   Respirations 18   /66   MAP (Calculated) 81   BP Location Left upper arm   BP Method Automatic   Patient Position Sitting   Oxygen Therapy   SpO2 95 %   O2 Device None (Room air)     Pt assessment complete.  Pt lying in bed quietly.  Lung sounds clear.  Pt NSR per monitor with HR of 80.  Nightly medications given.  Pt NPO after midnight for stress test in am.  Pt verbalizes understanding.  Fan provided per patient request.  No other needs at this time.  Call light within reach.

## 2025-01-04 NOTE — FLOWSHEET NOTE
01/03/25 2343   Vital Signs   Temp 97.8 °F (36.6 °C)   Temp Source Oral   Pulse 62   Heart Rate Source Monitor   Respirations 16   /75   MAP (Calculated) 88   BP Location Right upper arm   BP Method Automatic   Patient Position Sitting   Oxygen Therapy   SpO2 98 %   O2 Device None (Room air)     Pt reassessment complete.  No changes noted.  Pt NPO at this time.  Call light within reach.
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
## 2025-01-05 ENCOUNTER — HOSPITAL ENCOUNTER (INPATIENT)
Age: 60
LOS: 2 days | Discharge: HOME | DRG: 191 | End: 2025-01-07
Attending: STUDENT IN AN ORGANIZED HEALTH CARE EDUCATION/TRAINING PROGRAM | Admitting: INTERNAL MEDICINE
Payer: MEDICAID

## 2025-01-05 VITALS
TEMPERATURE: 97.9 F | DIASTOLIC BLOOD PRESSURE: 70 MMHG | HEIGHT: 69 IN | OXYGEN SATURATION: 99 % | WEIGHT: 176 LBS | SYSTOLIC BLOOD PRESSURE: 119 MMHG | BODY MASS INDEX: 26.07 KG/M2 | HEART RATE: 66 BPM | RESPIRATION RATE: 15 BRPM

## 2025-01-05 DIAGNOSIS — R07.9 CHEST PAIN: ICD-10-CM

## 2025-01-05 PROBLEM — I20.0 UNSTABLE ANGINA (HCC): Status: ACTIVE | Noted: 2025-01-05

## 2025-01-05 LAB
AMPHETAMINES UR QL SCN>1000 NG/ML: ABNORMAL
BARBITURATES UR QL SCN>200 NG/ML: ABNORMAL
BENZODIAZ UR QL SCN>200 NG/ML: ABNORMAL
CANNABINOIDS UR QL SCN>50 NG/ML: POSITIVE
COCAINE UR QL SCN: ABNORMAL
DRUG SCREEN COMMENT UR-IMP: ABNORMAL
EKG ATRIAL RATE: 55 BPM
EKG DIAGNOSIS: NORMAL
EKG P AXIS: 70 DEGREES
EKG P-R INTERVAL: 170 MS
EKG Q-T INTERVAL: 400 MS
EKG QRS DURATION: 88 MS
EKG QTC CALCULATION (BAZETT): 382 MS
EKG R AXIS: 76 DEGREES
EKG T AXIS: 79 DEGREES
EKG VENTRICULAR RATE: 55 BPM
FENTANYL SCREEN, URINE: ABNORMAL
METHADONE UR QL SCN>300 NG/ML: ABNORMAL
OPIATES UR QL SCN>300 NG/ML: ABNORMAL
OXYCODONE UR QL SCN: ABNORMAL
PCP UR QL SCN>25 NG/ML: ABNORMAL
PH UR STRIP: 5 [PH]

## 2025-01-05 PROCEDURE — 99238 HOSP IP/OBS DSCHRG MGMT 30/<: CPT | Performed by: INTERNAL MEDICINE

## 2025-01-05 PROCEDURE — 6370000000 HC RX 637 (ALT 250 FOR IP)

## 2025-01-05 PROCEDURE — 1200000000 HC SEMI PRIVATE

## 2025-01-05 PROCEDURE — 80307 DRUG TEST PRSMV CHEM ANLYZR: CPT

## 2025-01-05 PROCEDURE — 99223 1ST HOSP IP/OBS HIGH 75: CPT | Performed by: INTERNAL MEDICINE

## 2025-01-05 PROCEDURE — 6360000002 HC RX W HCPCS

## 2025-01-05 PROCEDURE — 6370000000 HC RX 637 (ALT 250 FOR IP): Performed by: INTERNAL MEDICINE

## 2025-01-05 PROCEDURE — 99232 SBSQ HOSP IP/OBS MODERATE 35: CPT | Performed by: PSYCHIATRY & NEUROLOGY

## 2025-01-05 PROCEDURE — 93010 ELECTROCARDIOGRAM REPORT: CPT | Performed by: INTERNAL MEDICINE

## 2025-01-05 RX ORDER — PANTOPRAZOLE SODIUM 40 MG/1
40 TABLET, DELAYED RELEASE ORAL DAILY
COMMUNITY

## 2025-01-05 RX ORDER — ACETAMINOPHEN 650 MG/1
650 SUPPOSITORY RECTAL EVERY 6 HOURS PRN
Status: DISCONTINUED | OUTPATIENT
Start: 2025-01-05 | End: 2025-01-07 | Stop reason: HOSPADM

## 2025-01-05 RX ORDER — ASPIRIN 81 MG/1
81 TABLET, CHEWABLE ORAL DAILY
Status: DISCONTINUED | OUTPATIENT
Start: 2025-01-06 | End: 2025-01-07 | Stop reason: HOSPADM

## 2025-01-05 RX ORDER — POLYETHYLENE GLYCOL 3350 17 G/17G
17 POWDER, FOR SOLUTION ORAL DAILY PRN
Status: DISCONTINUED | OUTPATIENT
Start: 2025-01-05 | End: 2025-01-07 | Stop reason: HOSPADM

## 2025-01-05 RX ORDER — SODIUM CHLORIDE 0.9 % (FLUSH) 0.9 %
5-40 SYRINGE (ML) INJECTION EVERY 12 HOURS SCHEDULED
Status: DISCONTINUED | OUTPATIENT
Start: 2025-01-05 | End: 2025-01-07 | Stop reason: HOSPADM

## 2025-01-05 RX ORDER — ACETAMINOPHEN 325 MG/1
650 TABLET ORAL EVERY 6 HOURS PRN
Status: DISCONTINUED | OUTPATIENT
Start: 2025-01-05 | End: 2025-01-07 | Stop reason: HOSPADM

## 2025-01-05 RX ORDER — PANTOPRAZOLE SODIUM 40 MG/1
40 TABLET, DELAYED RELEASE ORAL DAILY
Status: DISCONTINUED | OUTPATIENT
Start: 2025-01-06 | End: 2025-01-07 | Stop reason: HOSPADM

## 2025-01-05 RX ORDER — ONDANSETRON 4 MG/1
4 TABLET, ORALLY DISINTEGRATING ORAL EVERY 8 HOURS PRN
Status: DISCONTINUED | OUTPATIENT
Start: 2025-01-05 | End: 2025-01-07 | Stop reason: HOSPADM

## 2025-01-05 RX ORDER — SODIUM CHLORIDE 9 MG/ML
INJECTION, SOLUTION INTRAVENOUS CONTINUOUS
Status: ACTIVE | OUTPATIENT
Start: 2025-01-06 | End: 2025-01-06

## 2025-01-05 RX ORDER — MAGNESIUM SULFATE IN WATER 40 MG/ML
2000 INJECTION, SOLUTION INTRAVENOUS PRN
Status: DISCONTINUED | OUTPATIENT
Start: 2025-01-05 | End: 2025-01-07 | Stop reason: HOSPADM

## 2025-01-05 RX ORDER — POTASSIUM CHLORIDE 1500 MG/1
40 TABLET, EXTENDED RELEASE ORAL PRN
Status: DISCONTINUED | OUTPATIENT
Start: 2025-01-05 | End: 2025-01-07 | Stop reason: HOSPADM

## 2025-01-05 RX ORDER — SODIUM CHLORIDE 0.9 % (FLUSH) 0.9 %
5-40 SYRINGE (ML) INJECTION PRN
Status: DISCONTINUED | OUTPATIENT
Start: 2025-01-05 | End: 2025-01-07 | Stop reason: HOSPADM

## 2025-01-05 RX ORDER — SODIUM CHLORIDE 9 MG/ML
INJECTION, SOLUTION INTRAVENOUS PRN
Status: DISCONTINUED | OUTPATIENT
Start: 2025-01-05 | End: 2025-01-07 | Stop reason: HOSPADM

## 2025-01-05 RX ORDER — POTASSIUM CHLORIDE 7.45 MG/ML
10 INJECTION INTRAVENOUS PRN
Status: DISCONTINUED | OUTPATIENT
Start: 2025-01-05 | End: 2025-01-07 | Stop reason: HOSPADM

## 2025-01-05 RX ORDER — ENOXAPARIN SODIUM 100 MG/ML
40 INJECTION SUBCUTANEOUS DAILY
Status: DISCONTINUED | OUTPATIENT
Start: 2025-01-06 | End: 2025-01-07 | Stop reason: HOSPADM

## 2025-01-05 RX ORDER — NICOTINE 21 MG/24HR
1 PATCH, TRANSDERMAL 24 HOURS TRANSDERMAL DAILY
Status: DISCONTINUED | OUTPATIENT
Start: 2025-01-06 | End: 2025-01-07 | Stop reason: HOSPADM

## 2025-01-05 RX ORDER — ONDANSETRON 2 MG/ML
4 INJECTION INTRAMUSCULAR; INTRAVENOUS EVERY 6 HOURS PRN
Status: DISCONTINUED | OUTPATIENT
Start: 2025-01-05 | End: 2025-01-07 | Stop reason: HOSPADM

## 2025-01-05 RX ORDER — ROSUVASTATIN CALCIUM 10 MG/1
40 TABLET, COATED ORAL NIGHTLY
Status: DISCONTINUED | OUTPATIENT
Start: 2025-01-05 | End: 2025-01-07 | Stop reason: HOSPADM

## 2025-01-05 RX ADMIN — PANTOPRAZOLE SODIUM 40 MG: 40 TABLET, DELAYED RELEASE ORAL at 06:48

## 2025-01-05 RX ADMIN — ROSUVASTATIN CALCIUM 40 MG: 10 TABLET, FILM COATED ORAL at 21:51

## 2025-01-05 RX ADMIN — ENOXAPARIN SODIUM 40 MG: 100 INJECTION SUBCUTANEOUS at 08:23

## 2025-01-05 RX ADMIN — ASPIRIN 81 MG: 81 TABLET, CHEWABLE ORAL at 08:23

## 2025-01-05 ASSESSMENT — PAIN DESCRIPTION - DESCRIPTORS: DESCRIPTORS: TIGHTNESS

## 2025-01-05 NOTE — CARE COORDINATION
Resource folder provided to the pt. Pt is homeless. Plan for transfer to [facility] today. CM following.  
Stable.

## 2025-01-05 NOTE — CONSULTS
Cardiology Consultation   Date: 1/5/2025  Admit Date:  1/3/2025  Reason for Consultation: Unstable angina  Consult Requesting Physician: Ronnie Nicole*     Chief Complaint   Patient presents with    Chest Pain     Chest pain x a few years, daily for the last month. The patient describes it at as tightness.      HPI:  Mr. Ngo is a pleasant but interesting 59 year old male with a medical history significant for hypertension, anxiety, depression, chronic pain, and remote history of polysubstance abuse who presents from home with ongoing chest pain.  According to patient he was in his usual state of health until approximately 6 to 8 weeks ago when he began to suffer from progressively worsening substernal left-sided chest pain.  He states that he has an underlying pain that is made acutely worse.  He salvos of increased intensity tend to last for approximately 3 to 4 minutes.  His chest discomfort is associated with bilateral arm pain with the left being worse than the right.  He also reports some back and scapular pain when the pressure is intense along with some nausea.  He states that cold water seems to alleviate his symptoms.  He also reports some nondescript symptoms such as tingling in his face as if he can sweat.  Given his ongoing symptoms patient is in his [facility] for evaluation.    Patient is a current tobacco user.  Patient uses marijuana currently.  He quit drinking, which was heavy, approximately 10 years ago.  He denies ongoing illicit drug use however has a history of methamphetamine and cocaine use.    Patient denies fevers, chest pain, orthopnea, PND, lower extremity edema, abdominal swelling, shortness of breath, dyspnea on exertion, chills, visual changes, headaches, sore throat, cough, abdominal pain, nausea, vomiting, bleeding, bruising, dysuria, muscle/joint pain, confusion, depression, anxiety, skin lesions, etc.    Emergency Room/Hospital Course:  Patient is evaluated in

## 2025-01-05 NOTE — PLAN OF CARE
Problem: Discharge Planning  Goal: Discharge to home or other facility with appropriate resources  1/5/2025 1650 by Poncho Magaña, RN  Outcome: Completed  1/5/2025 1014 by Poncho Magaña, RN  Outcome: Progressing     Problem: Pain  Goal: Verbalizes/displays adequate comfort level or baseline comfort level  1/5/2025 1650 by Poncho Magaña, RN  Outcome: Completed  1/5/2025 1014 by Poncho Magaña, RN  Outcome: Progressing

## 2025-01-05 NOTE — FLOWSHEET NOTE
01/04/25 2342   Vital Signs   Temp 97.9 °F (36.6 °C)   Temp Source Oral   Pulse 64   Heart Rate Source Monitor   Respirations 16   BP (!) 99/56   MAP (Calculated) 70   BP Method Automatic   Patient Position Supine   Oxygen Therapy   SpO2 97 %   O2 Device None (Room air)     Pt reassessment complete. No changes noted.  Pt is lying in bed with their eyes closed. Respirations are easy and even. Call light within reach bed in lowest position with the wheels locked. Will continue to monitor. Monika Ferrara RN

## 2025-01-05 NOTE — FLOWSHEET NOTE
01/04/25 2025   Vital Signs   Temp 97.7 °F (36.5 °C)   Temp Source Oral   Pulse 63   Heart Rate Source Monitor   Respirations 16   BP (!) 91/51   MAP (Calculated) 64   BP Location Right upper arm   BP Method Automatic   Patient Position Supine   Oxygen Therapy   SpO2 98 %   O2 Device None (Room air)     Pt assessment complete.  Pt lying in bed quietly.  Lung sounds clear.  Pt NSR per monitor with HR of 63.  Nightly medications given.  Fresh ice water provided.  Denies any further needs.  Call light within reach.
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
## 2025-01-06 PROBLEM — R94.39 ABNORMAL STRESS TEST: Status: ACTIVE | Noted: 2025-01-06

## 2025-01-06 LAB
ANION GAP SERPL CALCULATED.3IONS-SCNC: 9 MMOL/L (ref 3–16)
BUN SERPL-MCNC: 20 MG/DL (ref 7–20)
CALCIUM SERPL-MCNC: 9.2 MG/DL (ref 8.3–10.6)
CHLORIDE SERPL-SCNC: 105 MMOL/L (ref 99–110)
CO2 SERPL-SCNC: 27 MMOL/L (ref 21–32)
CREAT SERPL-MCNC: 1 MG/DL (ref 0.8–1.3)
DEPRECATED RDW RBC AUTO: 13.1 % (ref 12.4–15.4)
EST. AVERAGE GLUCOSE BLD GHB EST-MCNC: 122.6 MG/DL
GFR SERPLBLD CREATININE-BSD FMLA CKD-EPI: 86 ML/MIN/{1.73_M2}
GLUCOSE SERPL-MCNC: 114 MG/DL (ref 70–99)
HBA1C MFR BLD: 5.9 %
HCT VFR BLD AUTO: 40.7 % (ref 40.5–52.5)
HGB BLD-MCNC: 14.1 G/DL (ref 13.5–17.5)
MCH RBC QN AUTO: 31.4 PG (ref 26–34)
MCHC RBC AUTO-ENTMCNC: 34.7 G/DL (ref 31–36)
MCV RBC AUTO: 90.6 FL (ref 80–100)
PLATELET # BLD AUTO: 226 K/UL (ref 135–450)
PMV BLD AUTO: 7.9 FL (ref 5–10.5)
POTASSIUM SERPL-SCNC: 4.2 MMOL/L (ref 3.5–5.1)
RBC # BLD AUTO: 4.5 M/UL (ref 4.2–5.9)
SODIUM SERPL-SCNC: 141 MMOL/L (ref 136–145)
TROPONIN, HIGH SENSITIVITY: 9 NG/L (ref 0–22)
WBC # BLD AUTO: 7.1 K/UL (ref 4–11)

## 2025-01-06 PROCEDURE — 2500000003 HC RX 250 WO HCPCS: Performed by: INTERNAL MEDICINE

## 2025-01-06 PROCEDURE — 84484 ASSAY OF TROPONIN QUANT: CPT

## 2025-01-06 PROCEDURE — 6370000000 HC RX 637 (ALT 250 FOR IP): Performed by: INTERNAL MEDICINE

## 2025-01-06 PROCEDURE — 85027 COMPLETE CBC AUTOMATED: CPT

## 2025-01-06 PROCEDURE — 93458 L HRT ARTERY/VENTRICLE ANGIO: CPT | Performed by: INTERNAL MEDICINE

## 2025-01-06 PROCEDURE — 36415 COLL VENOUS BLD VENIPUNCTURE: CPT

## 2025-01-06 PROCEDURE — 1200000000 HC SEMI PRIVATE

## 2025-01-06 PROCEDURE — 4A023N7 MEASUREMENT OF CARDIAC SAMPLING AND PRESSURE, LEFT HEART, PERCUTANEOUS APPROACH: ICD-10-PCS | Performed by: INTERNAL MEDICINE

## 2025-01-06 PROCEDURE — 99152 MOD SED SAME PHYS/QHP 5/>YRS: CPT | Performed by: INTERNAL MEDICINE

## 2025-01-06 PROCEDURE — 93005 ELECTROCARDIOGRAM TRACING: CPT | Performed by: INTERNAL MEDICINE

## 2025-01-06 PROCEDURE — B2111ZZ FLUOROSCOPY OF MULTIPLE CORONARY ARTERIES USING LOW OSMOLAR CONTRAST: ICD-10-PCS | Performed by: INTERNAL MEDICINE

## 2025-01-06 PROCEDURE — C1894 INTRO/SHEATH, NON-LASER: HCPCS | Performed by: INTERNAL MEDICINE

## 2025-01-06 PROCEDURE — 6360000004 HC RX CONTRAST MEDICATION: Performed by: INTERNAL MEDICINE

## 2025-01-06 PROCEDURE — 2709999900 HC NON-CHARGEABLE SUPPLY: Performed by: INTERNAL MEDICINE

## 2025-01-06 PROCEDURE — C1769 GUIDE WIRE: HCPCS | Performed by: INTERNAL MEDICINE

## 2025-01-06 PROCEDURE — 6360000002 HC RX W HCPCS: Performed by: INTERNAL MEDICINE

## 2025-01-06 PROCEDURE — 2580000003 HC RX 258: Performed by: INTERNAL MEDICINE

## 2025-01-06 PROCEDURE — 80048 BASIC METABOLIC PNL TOTAL CA: CPT

## 2025-01-06 RX ORDER — ONDANSETRON 2 MG/ML
4 INJECTION INTRAMUSCULAR; INTRAVENOUS EVERY 6 HOURS PRN
Status: DISCONTINUED | OUTPATIENT
Start: 2025-01-06 | End: 2025-01-06 | Stop reason: SDUPTHER

## 2025-01-06 RX ORDER — ACETAMINOPHEN 325 MG/1
650 TABLET ORAL EVERY 4 HOURS PRN
Status: DISCONTINUED | OUTPATIENT
Start: 2025-01-06 | End: 2025-01-06 | Stop reason: SDUPTHER

## 2025-01-06 RX ORDER — SODIUM CHLORIDE 0.9 % (FLUSH) 0.9 %
5-40 SYRINGE (ML) INJECTION PRN
Status: DISCONTINUED | OUTPATIENT
Start: 2025-01-06 | End: 2025-01-06 | Stop reason: HOSPADM

## 2025-01-06 RX ORDER — SODIUM CHLORIDE 0.9 % (FLUSH) 0.9 %
5-40 SYRINGE (ML) INJECTION PRN
Status: DISCONTINUED | OUTPATIENT
Start: 2025-01-06 | End: 2025-01-07 | Stop reason: HOSPADM

## 2025-01-06 RX ORDER — SODIUM CHLORIDE 0.9 % (FLUSH) 0.9 %
5-40 SYRINGE (ML) INJECTION EVERY 12 HOURS SCHEDULED
Status: DISCONTINUED | OUTPATIENT
Start: 2025-01-06 | End: 2025-01-06 | Stop reason: HOSPADM

## 2025-01-06 RX ORDER — SODIUM CHLORIDE 9 MG/ML
INJECTION, SOLUTION INTRAVENOUS PRN
Status: DISCONTINUED | OUTPATIENT
Start: 2025-01-06 | End: 2025-01-06 | Stop reason: HOSPADM

## 2025-01-06 RX ORDER — SODIUM CHLORIDE 9 MG/ML
INJECTION, SOLUTION INTRAVENOUS PRN
Status: DISCONTINUED | OUTPATIENT
Start: 2025-01-06 | End: 2025-01-07 | Stop reason: HOSPADM

## 2025-01-06 RX ORDER — MIDAZOLAM HYDROCHLORIDE 1 MG/ML
INJECTION, SOLUTION INTRAMUSCULAR; INTRAVENOUS PRN
Status: DISCONTINUED | OUTPATIENT
Start: 2025-01-06 | End: 2025-01-06 | Stop reason: HOSPADM

## 2025-01-06 RX ORDER — SODIUM CHLORIDE 0.9 % (FLUSH) 0.9 %
5-40 SYRINGE (ML) INJECTION EVERY 12 HOURS SCHEDULED
Status: DISCONTINUED | OUTPATIENT
Start: 2025-01-06 | End: 2025-01-07 | Stop reason: HOSPADM

## 2025-01-06 RX ORDER — LORAZEPAM 0.5 MG/1
0.5 TABLET ORAL
Status: DISCONTINUED | OUTPATIENT
Start: 2025-01-06 | End: 2025-01-06 | Stop reason: HOSPADM

## 2025-01-06 RX ORDER — NITROGLYCERIN 0.4 MG/1
0.4 TABLET SUBLINGUAL EVERY 5 MIN PRN
Status: DISCONTINUED | OUTPATIENT
Start: 2025-01-06 | End: 2025-01-07 | Stop reason: HOSPADM

## 2025-01-06 RX ORDER — IOPAMIDOL 755 MG/ML
INJECTION, SOLUTION INTRAVASCULAR PRN
Status: DISCONTINUED | OUTPATIENT
Start: 2025-01-06 | End: 2025-01-06 | Stop reason: HOSPADM

## 2025-01-06 RX ORDER — HEPARIN SODIUM 1000 [USP'U]/ML
INJECTION, SOLUTION INTRAVENOUS; SUBCUTANEOUS PRN
Status: DISCONTINUED | OUTPATIENT
Start: 2025-01-06 | End: 2025-01-06 | Stop reason: HOSPADM

## 2025-01-06 RX ORDER — FENTANYL CITRATE 50 UG/ML
INJECTION, SOLUTION INTRAMUSCULAR; INTRAVENOUS PRN
Status: DISCONTINUED | OUTPATIENT
Start: 2025-01-06 | End: 2025-01-06 | Stop reason: HOSPADM

## 2025-01-06 RX ORDER — ASPIRIN 81 MG/1
243 TABLET, CHEWABLE ORAL ONCE
Status: COMPLETED | OUTPATIENT
Start: 2025-01-06 | End: 2025-01-06

## 2025-01-06 RX ADMIN — ROSUVASTATIN CALCIUM 40 MG: 10 TABLET, FILM COATED ORAL at 19:20

## 2025-01-06 RX ADMIN — PANTOPRAZOLE SODIUM 40 MG: 40 TABLET, DELAYED RELEASE ORAL at 08:02

## 2025-01-06 RX ADMIN — SODIUM CHLORIDE: 9 INJECTION, SOLUTION INTRAVENOUS at 00:41

## 2025-01-06 RX ADMIN — ASPIRIN 243 MG: 81 TABLET, CHEWABLE ORAL at 10:12

## 2025-01-06 RX ADMIN — ASPIRIN 81 MG: 81 TABLET, CHEWABLE ORAL at 08:02

## 2025-01-06 RX ADMIN — Medication 10 ML: at 19:23

## 2025-01-06 RX ADMIN — Medication 10 ML: at 19:21

## 2025-01-06 ASSESSMENT — ENCOUNTER SYMPTOMS: ABDOMINAL PAIN: 0

## 2025-01-06 NOTE — H&P
Hospital Medicine History & Physical      Date of Admission: 1/5/2025    Date of Service:  Pt seen/examined on 1/5/25     [x]Admitted to Inpatient with expected LOS greater than two midnights due to medical therapy.  []Placed in Observation status.    Chief Admission Complaint:  positive stress test, unstable angina    Presenting Admission History:      59 y.o. man with a history of hypertension, remote history of polysubstance abuse, ongoing tobacco use who presented to outside facility with chest pain.  Patient reports chest pain at least for the last 6 to 8 weeks intermittent but increasing frequency and intensity, mostly associated with activity.  Also associated with some nausea shortness of breath and discomfort radiating down both arms.  His EKG was without ischemic changes and serial troponin were negative so he underwent a stress test that showed ischemia.  Perfusion study showed moderate size mild intensity basal to mid inferior and inferior septal wall perfusion defect primarily with stress images consistent with ischemia.  Echo showed normal LV function with EF of 55 to 60% with grade 1 diastolic dysfunction.  He was evaluated by cardiology who recommended left heart cath so he has been transferred here to [facility] for left heart cath in a.m.  Currently he is chest pain-free and hemodynamically stable.  He also reported left leg paresthesias and underwent workup for stroke.  MRI was negative.  He was evaluated by neurology who recommended outpatient EMG studies.    Assessment/Plan:      Unstable angina -progressive chest pain, EKG without ischemic changes and serial troponin negative but positive stress test suggesting ischemia  Currently hemodynamically stable and chest pain-free  Stress test and echo results as noted above  N.p.o. after midnight for  left heart cath in a.m.  Continue aspirin and Crestor.  Beta-blocker was not added due to mild bradycardia although heart rate

## 2025-01-06 NOTE — PROCEDURES
CARDIAC CATHETERIZATION REPORT    Date of Procedure: 1/6/2025  : Tyler Salazar DO  Primary Indication: Chest pain, abnormal nuclear SPECT stress test    Procedures Performed:  1. Coronary angiography  2. Left heart catheterization  3. Moderate conscious sedation    Procedural Details:  Access: Local anesthetic was given and access was obtained in the right radial artery using a micropuncture technique and a 6F Terumo Slender Sheath was placed without difficulty.    Diagnostic: A 5F TIG catheter was used to perform selective right and left coronary angiography.  The 5F TIG catheter was also used to perform the left heart catheterization.  No significant gradient was observed on pull-back of the catheter across the aortic valve.    Hemostasis: At the end of the procedure, the radial sheath was removed and a hemoband was placed over the arteriotomy site and filled with air to maintain hemostasis.    Findings:  Hemodynamics:     A. Opening arterial pressure: 93/52 (70) mmHg      B. LVEDP: 10 mmHg    2.  Coronary anatomy:  A. Left main artery: The left main artery bifurcates into the left anterior descending artery and left circumflex artery.  The left main artery has minor luminal irregularities.  B. Left anterior descending artery: Transapical vessel which gives rise to one significant diagonal artery.  The mid-LAD has sequential 20-30% and 30% stenoses.  The diagonal artery is a large vessel with minor luminal irregularities.  C. Left circumflex artery: Non-dominant vessel that gives rise to 2 obtuse marginal arteries.  The LCx has minor luminal irregularities.  The obtuse marginal arteries have minor luminal irregularities.  D. Right coronary artery: Dominant vessel that gives rise to the posterior descending artery and 2 posterolateral branches.  The RCA has a 20-30% proximal stenosis and 20% mid-vessel stenosis.  The RPDA has minor luminal irregularities.  The right posterolateral branches have minor

## 2025-01-06 NOTE — SEDATION DOCUMENTATION
Brief Pre-Op Note/Sedation Assessment      Errol Ngo  1965  [id number]  11:56 AM    Planned Procedure: Cardiac Catheterization Procedure  Post Procedure Plan: Return to same level of care  Consent: I have discussed with the patient and/or the patient representative the indication, alternatives, and the possible risks and/or complications of the planned procedure and the anesthesia methods. The patient and/or patient representative appear to understand and agree to proceed.      Chief Complaint:   Chest pain, abnormal nuclear SPECT stress test      Indications for Cath Procedure:  Presentation:  New Onset Angina <= 2 months  2.  Anginal Classification within 2 weeks:  CCS IV - Inability to perform any activity without angina or angina at rest, i.e., severe limitation  3.  Angina Symptoms Assessment:  Typical Chest Pain  4.  Heart Failure Class within last 2 weeks:  No symptoms  5.  Cardiovascular Instability:  No    Prior Ischemic Workup/Eval:  Pre-Procedural Medications: Yes: Aspirin and STATIN  2.   Stress Test Completed?  Yes:  Stress or Imaging Studies Performed (within ANY time period):   Type:  Stress Nuclear  Results:  Positive:  Myocardial Perfusion Defects (Nuclear) Extent of Ischemia:  Intermediate    Does Patient need surgery?  Cath Valve Surgery:  No    Pre-Procedure Medical History:  Vital Signs:  /66   Pulse 64   Temp 98 °F (36.7 °C) (Oral)   Resp 18   Ht 1.753 m (5' 9\")   Wt 79.5 kg (175 lb 3.2 oz)   SpO2 99%   BMI 25.87 kg/m²     Allergies:  No Known Allergies  Medications:    Current Facility-Administered Medications   Medication Dose Route Frequency Provider Last Rate Last Admin    nitroGLYCERIN (NITROSTAT) SL tablet 0.4 mg  0.4 mg SubLINGual Q5 Min PRN Jamee Whittaker MD        pantoprazole (PROTONIX) tablet 40 mg  40 mg Oral Daily Jamee Whittaker MD   40 mg at 01/06/25 0802    sodium chloride flush 0.9 % injection 5-40 mL  5-40 mL IntraVENous 2 times per day Panfilo

## 2025-01-06 NOTE — CARE COORDINATION
Case Management Assessment  Initial Evaluation    Date/Time of Evaluation: 1/6/2025 12:52 PM  Assessment Completed by: Umm Dunn RN    If patient is discharged prior to next notation, then this note serves as note for discharge by case management.    Patient Name: Errol Ngo                   YOB: 1965  Diagnosis: Unstable angina (HCC) [I20.0]                   Date / Time: 1/5/2025  8:49 PM    Patient Admission Status: Inpatient   Readmission Risk (Low < 19, Mod (19-27), High > 27): Readmission Risk Score: 7.4    Current PCP: Soni Holman APRN - CNP  PCP verified by CM? Yes    Chart Reviewed: Yes      History Provided by: Patient  Patient Orientation: Alert and Oriented    Patient Cognition: Alert    Hospitalization in the last 30 days (Readmission):  No    If yes, Readmission Assessment in CM Navigator will be completed.    Advance Directives:      Code Status: Full Code   Patient's Primary Decision Maker is: Legal Next of Kin      Discharge Planning:    Patient lives with: Alone Type of Home: Homeless  Primary Care Giver: Self  Patient Support Systems include: Family Members   Current Financial resources: Medicaid  Current community resources: None  Current services prior to admission: None            Current DME:              Type of Home Care services:  None    ADLS  Prior functional level: Independent in ADLs/IADLs  Current functional level: Independent in ADLs/IADLs    PT AM-PAC:   /24  OT AM-PAC:   /24    Family can provide assistance at DC: No  Would you like Case Management to discuss the discharge plan with any other family members/significant others, and if so, who? No  Plans to Return to Present Housing: Yes  Other Identified Issues/Barriers to RETURNING to current housing:   Potential Assistance needed at discharge: Transportation            Potential DME:    Patient expects to discharge to: Other (comment) (daughter's house)  Plan for transportation at discharge:

## 2025-01-06 NOTE — PROGRESS NOTES
Admit: 1/3/2025    Name:  Errol Ngo  Room:  /0322-01  MRN:    [id number]    Daily Progress Note for 2025   Chest pain    Interval History:   C/o intermittent chest tightness   Scheduled Meds:   sodium chloride flush  5-40 mL IntraVENous 2 times per day    enoxaparin  40 mg SubCUTAneous Daily    rosuvastatin  40 mg Oral Nightly    aspirin  81 mg Oral Daily    Or    aspirin  300 mg Rectal Daily    pantoprazole  40 mg Oral QAM AC       Continuous Infusions:   sodium chloride         PRN Meds:  sodium chloride flush, sodium chloride, ondansetron **OR** ondansetron, polyethylene glycol, labetalol, perflutren lipid microspheres, albuterol sulfate HFA                  Objective:     Temp  Av °F (36.7 °C)  Min: 97.7 °F (36.5 °C)  Max: 98.3 °F (36.8 °C)  Pulse  Av.8  Min: 62  Max: 81  BP  Min: 98/64  Max: 122/74  SpO2  Av.3 %  Min: 95 %  Max: 98 %  Patient Vitals for the past 4 hrs:   BP Temp Temp src Pulse Resp SpO2   25 1100 116/72 98 °F (36.7 °C) Oral 81 16 98 %         Intake/Output Summary (Last 24 hours) at 2025 1246  Last data filed at 2025 1051  Gross per 24 hour   Intake 462 ml   Output --   Net 462 ml       Physical Exam:  General:  Awake, alert and oriented. Appears to be not in any distress  Mucous Membranes:  Pink , anicteric  Neck: No JVD, no carotid bruit, no thyromegaly  Chest:  Clear to auscultation bilaterally, no added sounds  Cardiovascular:  RRR S1S2 heard, no murmurs or gallops  Abdomen:  Soft, undistended, non tender, no organomegaly, BS present  Extremities: No edema or cyanosis. Distal pulses well felt  Neurological : no focal deficits    Lab Data:  CBC:   Recent Labs     25  1336 25  0544   WBC 6.6 4.7   RBC 4.71 4.95   HGB 14.6 15.1   HCT 42.1 44.7   MCV 89.2 90.3   RDW 13.2 13.3    241     BMP:   Recent Labs     25  1336 25  0544    140   K 4.0 4.9    105   CO2 27 28   BUN 21* 19   CREATININE 1.0 1.1     BNP: No 
Angelia from transfer center called and said they have a bed for patient to go to [facility] room 358  Patient will be picked up at 1930, by [facility] excepting MD is Dr Montiel. However [facility] called back and asked if they could pick patient up at 1815 so we agreed that would be fine.Kait Gustafson 1525 1-5-25  
[facility] called for irregular rhythm.  STAT EKG ordered  Patient denied chest pain or discomfort.  Was laying calmly, and quietly with eyes closed.  Printed episode and filed in chart.    
Consult called in to Cardiology. 1-4-25 @7255. Louann Mccray  
Consult has been added to Chandler Serrano RN on 1/3/25. 6:43 PM    Mariana Domínguez  1/3/2025  
Consult has been perfect served to Dr. Carr on 1/3/25. 6:42 PM    Mariana Domínguez  1/3/2025  
EOS report given to ALYSSA Isaac.  Care transferred at this time.    
EOS report given to ALYSSA Isaac. Care transferred at this time.    
Inpatient Occupational and Physical Therapy Evaluation Attempt    Pt chart reviewed.  RN contacted and states pt is independently moving in the room. Upon arrival, pt resting in bed and pt denies any therapy needs.  Pt reports that he has no history of falls.  He manages ambulation at home independently with no device, including 2 PRINCE with rail and no steps in home.  Pt reports he is independent with all ADLs.  Pt politely declining OT/PT at this time, stating no need.  OT/PT to sign off at this time.  Should new concerns/needs arise, pt was informed that he can ask for new therapy orders; pt verbalized understanding and agreement.    No charge.     Emily Mary, PT, DPT   #[id number]    Hortensia Rowley, OTR/L      
Neurology Progress Note    ID: Errol Ngo is a 59 y.o. male    : 1965     LOS: 2 days     ASSESSMENT    Principal Problem:    Chest pain  Active Problems:    Numbness and tingling of left lower extremity  Resolved Problems:    * No resolved hospital problems. *    There is no clear focal deficit on examination.  MRI brain did not  any acute pathology.    From neurology perspective, this can be peripheral neuropathy.    Plan:    1.  Outpatient EMG.  2.  May continue aspirin per cardiology.  3.  The target blood pressure below 140/90.    The patient can be discharged if there is no other concern.    Medications:  Scheduled Meds:    sodium chloride flush  5-40 mL IntraVENous 2 times per day    enoxaparin  40 mg SubCUTAneous Daily    rosuvastatin  40 mg Oral Nightly    aspirin  81 mg Oral Daily    Or    aspirin  300 mg Rectal Daily    pantoprazole  40 mg Oral QAM AC     Continuous Infusions:    sodium chloride       PRN Meds: sodium chloride flush, sodium chloride, ondansetron **OR** ondansetron, polyethylene glycol, labetalol, perflutren lipid microspheres, albuterol sulfate HFA    OBJECTIVE  Vital signs in the last 24 hours:  Vitals:    25 1145   BP: 115/78   Pulse: 62   Resp: 18   Temp: 98.1 °F (36.7 °C)   SpO2: 98%       Intake/Output last 3 shifts:  I/O last 3 completed shifts:  In: 922 [P.O.:922]  Out: 250 [Urine:250]    Intake/Output this shift:  I/O this shift:  In: 960 [P.O.:960]  Out: 200 [Urine:200]    Yesterday's Weight:  Wt Readings from Last 1 Encounters:   25 79.8 kg (176 lb)       SUBJECTIVE  There was no acute event overnight.    ROS:  Negative except in subjective.    Neurological examination:  MENTAL STATUS:  Alert and oriented to person.  LANG/SPEECH: No dysarthria.  CRANIAL NERVES: No facial asymmetry.  MOTOR: No pronator drift or leg drift.  REFLEXES: Generalized 2+.  SENSORY: Grossly intact.  COORD: No tremor.    Labs and Imaging:  I reviewed labs and brain 
Patient assessed and AM medications given.  Patient denies any questions regarding transfer to [facility] in AM.  Report stomach upset overnight.  Tolerated breakfast well this AM.  Denies any further needs at this time.  Call light within reach.   
Patient assessed and Possible discharge pending Stress Test results.  Patient off unit at shift change this AM.  Call light within reach.   
Patient stable.  Report given to ALYSSA Curiel  
Patient stable.  Report given to ALYSSA Johnson  
Patient updated.  Cardiology to see in AM.    
RT Inhaler-Nebulizer Bronchodilator Protocol Note    There is a bronchodilator order in the chart from a provider indicating to follow the RT Bronchodilator Protocol and there is an “Initiate RT Inhaler-Nebulizer Bronchodilator Protocol” order as well (see protocol at bottom of note).    CXR Findings:  XR CHEST PORTABLE    Result Date: 1/3/2025  No acute abnormality.       The findings from the last RT Protocol Assessment were as follows:   History Pulmonary Disease: Smoker 15 pack years or more  Respiratory Pattern: Regular pattern and RR 12-20 bpm  Breath Sounds: Slightly diminished and/or crackles  Cough: Strong, spontaneous, non-productive  Indication for Bronchodilator Therapy: Decreased or absent breath sounds  Bronchodilator Assessment Score: 3    Aerosolized bronchodilator medication orders have been revised according to the RT Inhaler-Nebulizer Bronchodilator Protocol below.    Respiratory Therapist to perform RT Therapy Protocol Assessment initially then follow the protocol.  Repeat RT Therapy Protocol Assessment PRN for score 0-3 or on second treatment, BID, and PRN for scores above 3.    No Indications - adjust the frequency to every 6 hours PRN wheezing or bronchospasm, if no treatments needed after 48 hours then discontinue using Per Protocol order mode.     If indication present, adjust the RT bronchodilator orders based on the Bronchodilator Assessment Score as indicated below.  Use Inhaler orders unless patient has one or more of the following: on home nebulizer, not able to hold breath for 10 seconds, is not alert and oriented, cannot activate and use MDI correctly, or respiratory rate 25 breaths per minute or more, then use the equivalent nebulizer order(s) with same Frequency and PRN reasons based on the score.  If a patient is on this medication at home then do not decrease Frequency below that used at home.    0-3 - enter or revise RT bronchodilator order(s) to equivalent RT Bronchodilator 
Report called to ALYSSA Hernandez at [facility].    
    BNP: No results for input(s): \"BNP\" in the last 72 hours.  PT/INR:   Recent Labs     01/03/25  1336   PROTIME 12.7   INR 0.93     APTT:No results for input(s): \"APTT\" in the last 72 hours.  CARDIAC ENZYMES: No results for input(s): \"CKMB\", \"CKMBINDEX\", \"TROPONINI\" in the last 72 hours.    Invalid input(s): \"CKTOTAL;3\"  FASTING LIPID PANEL:  Lab Results   Component Value Date/Time    CHOL 192 01/04/2025 05:44 AM    HDL 36 01/04/2025 05:44 AM    TRIG 84 01/04/2025 05:44 AM     LIVER PROFILE:   Recent Labs     01/03/25  1336   AST 19   ALT 11   BILITOT <0.2   ALKPHOS 92         MRI brain without contrast   Final Result   No acute intracranial abnormality.         XR ABDOMEN (KUB) (SINGLE AP VIEW)   Final Result   1. No radiopaque foreign body in the abdomen or pelvis.   2. No evidence of bowel obstruction.         CT LUMBAR SPINE WO CONTRAST   Final Result   No acute lumbar spine fracture or subluxation.      Severe multilevel disc disease.  MR correlation recommended.         XR CHEST PORTABLE   Final Result   No acute abnormality.         CTA HEAD NECK W CONTRAST   Final Result   No large vessel occlusion in the head or neck.         CT HEAD WO CONTRAST   Final Result   No acute intracranial abnormality.      Paranasal sinus disease      RECOMMENDATIONS:   Results discussed with SARAH ASHBY by Mike Meza MD at 2:20 pm on   1/3/2025                 Stress Combined Conclusion: The study is most consistent with myocardial ischemia. Findings suggest a moderate risk of cardiac events.    Stress Function: Left ventricular function post-stress is abnormal. Global function is mildly reduced. Post-stress ejection fraction is 51%. The stress end diastolic cavity size is normal. Stress end diastolic volume: 89 mL. Stress end systolic volume: 44 mL. LV mass: 120.0 g.  There is apical and septal wall hypokinesis.    Perfusion Comments: There is moderate size, mild intensity, basal to mid inferior and inferior septal

## 2025-01-06 NOTE — CONSULTS
Consult Call Back    unstable angina, positive stress test    Who: [facility]   Date:1/5/2025,  Time:9:48 PM    Electronically signed by Xin Mckeon on 1/5/25 at 9:48 PM EST

## 2025-01-07 VITALS
RESPIRATION RATE: 14 BRPM | DIASTOLIC BLOOD PRESSURE: 80 MMHG | HEART RATE: 65 BPM | OXYGEN SATURATION: 99 % | TEMPERATURE: 97.7 F | SYSTOLIC BLOOD PRESSURE: 120 MMHG | WEIGHT: 176.2 LBS | BODY MASS INDEX: 26.1 KG/M2 | HEIGHT: 69 IN

## 2025-01-07 PROBLEM — R07.89 OTHER CHEST PAIN: Status: ACTIVE | Noted: 2025-01-03

## 2025-01-07 PROBLEM — E78.00 PURE HYPERCHOLESTEROLEMIA: Status: ACTIVE | Noted: 2025-01-07

## 2025-01-07 PROBLEM — I25.10 MILD CAD: Status: ACTIVE | Noted: 2025-01-07

## 2025-01-07 PROBLEM — R07.9 CHEST PAIN: Status: RESOLVED | Noted: 2025-01-03 | Resolved: 2025-01-07

## 2025-01-07 LAB
ANION GAP SERPL CALCULATED.3IONS-SCNC: 8 MMOL/L (ref 3–16)
BUN SERPL-MCNC: 17 MG/DL (ref 7–20)
CALCIUM SERPL-MCNC: 9.1 MG/DL (ref 8.3–10.6)
CHLORIDE SERPL-SCNC: 104 MMOL/L (ref 99–110)
CO2 SERPL-SCNC: 28 MMOL/L (ref 21–32)
CREAT SERPL-MCNC: 1.1 MG/DL (ref 0.8–1.3)
DEPRECATED RDW RBC AUTO: 13.1 % (ref 12.4–15.4)
EKG ATRIAL RATE: 57 BPM
EKG ATRIAL RATE: 62 BPM
EKG DIAGNOSIS: NORMAL
EKG DIAGNOSIS: NORMAL
EKG P AXIS: 65 DEGREES
EKG P AXIS: 73 DEGREES
EKG P-R INTERVAL: 164 MS
EKG P-R INTERVAL: 164 MS
EKG Q-T INTERVAL: 382 MS
EKG Q-T INTERVAL: 390 MS
EKG QRS DURATION: 86 MS
EKG QRS DURATION: 88 MS
EKG QTC CALCULATION (BAZETT): 379 MS
EKG QTC CALCULATION (BAZETT): 387 MS
EKG R AXIS: 72 DEGREES
EKG R AXIS: 72 DEGREES
EKG T AXIS: 71 DEGREES
EKG T AXIS: 77 DEGREES
EKG VENTRICULAR RATE: 57 BPM
EKG VENTRICULAR RATE: 62 BPM
GFR SERPLBLD CREATININE-BSD FMLA CKD-EPI: 77 ML/MIN/{1.73_M2}
GLUCOSE SERPL-MCNC: 100 MG/DL (ref 70–99)
HCT VFR BLD AUTO: 43.5 % (ref 40.5–52.5)
HGB BLD-MCNC: 14.7 G/DL (ref 13.5–17.5)
MCH RBC QN AUTO: 30.8 PG (ref 26–34)
MCHC RBC AUTO-ENTMCNC: 33.7 G/DL (ref 31–36)
MCV RBC AUTO: 91.4 FL (ref 80–100)
PLATELET # BLD AUTO: 228 K/UL (ref 135–450)
PMV BLD AUTO: 7.9 FL (ref 5–10.5)
POTASSIUM SERPL-SCNC: 4.9 MMOL/L (ref 3.5–5.1)
RBC # BLD AUTO: 4.76 M/UL (ref 4.2–5.9)
SODIUM SERPL-SCNC: 140 MMOL/L (ref 136–145)
WBC # BLD AUTO: 6 K/UL (ref 4–11)

## 2025-01-07 PROCEDURE — 36415 COLL VENOUS BLD VENIPUNCTURE: CPT

## 2025-01-07 PROCEDURE — 6370000000 HC RX 637 (ALT 250 FOR IP): Performed by: INTERNAL MEDICINE

## 2025-01-07 PROCEDURE — 99232 SBSQ HOSP IP/OBS MODERATE 35: CPT | Performed by: NURSE PRACTITIONER

## 2025-01-07 PROCEDURE — 85027 COMPLETE CBC AUTOMATED: CPT

## 2025-01-07 PROCEDURE — 80048 BASIC METABOLIC PNL TOTAL CA: CPT

## 2025-01-07 PROCEDURE — 93010 ELECTROCARDIOGRAM REPORT: CPT | Performed by: INTERNAL MEDICINE

## 2025-01-07 RX ORDER — ROSUVASTATIN CALCIUM 40 MG/1
40 TABLET, COATED ORAL NIGHTLY
Qty: 30 TABLET | Refills: 3 | Status: SHIPPED | OUTPATIENT
Start: 2025-01-07

## 2025-01-07 RX ORDER — ASPIRIN 81 MG/1
81 TABLET, CHEWABLE ORAL DAILY
Qty: 30 TABLET | Refills: 3 | Status: SHIPPED | OUTPATIENT
Start: 2025-01-08

## 2025-01-07 RX ORDER — NICOTINE 21 MG/24HR
1 PATCH, TRANSDERMAL 24 HOURS TRANSDERMAL DAILY
Qty: 30 PATCH | Refills: 3 | Status: CANCELLED | OUTPATIENT
Start: 2025-01-08

## 2025-01-07 RX ORDER — ONDANSETRON 4 MG/1
4 TABLET, ORALLY DISINTEGRATING ORAL EVERY 8 HOURS PRN
Qty: 90 TABLET | Refills: 0 | Status: SHIPPED | OUTPATIENT
Start: 2025-01-07 | End: 2025-02-06

## 2025-01-07 RX ADMIN — ASPIRIN 81 MG: 81 TABLET, CHEWABLE ORAL at 08:14

## 2025-01-07 RX ADMIN — PANTOPRAZOLE SODIUM 40 MG: 40 TABLET, DELAYED RELEASE ORAL at 08:14

## 2025-01-07 NOTE — CARE COORDINATION
CASE MANAGEMENT DISCHARGE SUMMARY      Discharge to: home    Transportation:   Agency used: [facility]    Confirmed discharge plan with:     Patient: yes     Family:  yes     RN, name: Romelia Dunn RN

## 2025-01-07 NOTE — PLAN OF CARE
Problem: Discharge Planning  Goal: Discharge to home or other facility with appropriate resources  1/7/2025 1226 by Romelia Ghosh RN  Outcome: Completed  1/6/2025 2248 by Piper Brunner RN  Outcome: Progressing     Problem: Safety - Adult  Goal: Free from fall injury  1/7/2025 1226 by Romelia Ghosh RN  Outcome: Completed  1/6/2025 2248 by Piper Brunner RN  Outcome: Progressing     Problem: Cardiovascular - Adult  Goal: Maintains optimal cardiac output and hemodynamic stability  1/7/2025 1226 by Romelia Ghosh RN  Outcome: Completed  1/6/2025 2248 by Piper Brunner RN  Outcome: Progressing  Goal: Absence of cardiac dysrhythmias or at baseline  1/7/2025 1226 by Romelia Ghosh RN  Outcome: Completed  1/6/2025 2248 by Piper Brunner RN  Outcome: Progressing     Problem: Skin/Tissue Integrity - Adult  Goal: Skin integrity remains intact  1/7/2025 1226 by Romelia Ghosh RN  Outcome: Completed  1/6/2025 2248 by Piper Brunner RN  Outcome: Progressing

## 2025-01-07 NOTE — DISCHARGE SUMMARY
V2.0  Discharge Summary    Name:  Errol Ngo /Age/Sex: 1965 (59 y.o. male)   Admit Date: 2025  Discharge Date: 25    MRN & CSN:  [id number] & [id number] Encounter Date and Time 25 3:33 PM EST    Attending:  No att. providers found Discharging Provider: Richmond Carmona MD       Hospital Course:     Brief HPI: Errol Ngo is a 59 y.o. male who presented with No chief complaint on file.       Per Initial H&P note: 2025   59 y.o. man with a history of hypertension, remote history of polysubstance abuse, ongoing tobacco use who presented to outside facility with chest pain.  Patient reports chest pain at least for the last 6 to 8 weeks intermittent but increasing frequency and intensity, mostly associated with activity.  Also associated with some nausea shortness of breath and discomfort radiating down both arms.  His EKG was without ischemic changes and serial troponin were negative so he underwent a stress test that showed ischemia.  Perfusion study showed moderate size mild intensity basal to mid inferior and inferior septal wall perfusion defect primarily with stress images consistent with ischemia.  Echo showed normal LV function with EF of 55 to 60% with grade 1 diastolic dysfunction.  He was evaluated by cardiology who recommended left heart cath so he has been transferred here to [facility] for left heart cath in a.m.  Currently he is chest pain-free and hemodynamically stable.  He also reported left leg paresthesias and underwent workup for stroke.  MRI was negative.  He was evaluated by neurology who recommended outpatient EMG studies.    Brief Problem Based Course:     Unstable angina  -Stress test positive for ischemia, troponins and EKG negative   -Cardiology consulted during this admission   -Left heart cath  -mild nonobstructive CAD in right circumflex, LAD  -Continuing aspirin 81 mg, Lipitor 40  -Counseled on importance of moderating risk factors

## 2025-01-07 NOTE — PROGRESS NOTES
V2.0    [facility] Progress Note      Name:  Errol Ngo /Age/Sex: 1965  (59 y.o. male)   MRN & CSN:  [id number] & [id number] Encounter Date/Time: 2025 8:16 AM EST   Location:  0358/0358-01 PCP: Soni Holman APRN - CNP     Attending:Josemanuel Mejia MD       Hospital Day: 2    Assessment and Recommendations   Errol Ngo is a 59 y.o. male with pmh of  has a past medical history of Major depression, recurrent (HCC). who presents with No chief complaint on file.         Interval Events:   No acute events overnight.  Patient seen this a.m. sleeping initially.  Patient describes for the last 30 years she has had on and off chest pain with activity.  Reports he works as a , diet consists of mostly meat, potatoes.  Reports he used to smoke 2 packs/day and now smokes half a pack per day of cigarettes.  Reports episode of chest pain around 6 AM this morning, but no current chest pain at the time of evaluation.  Denies palpitations, shortness of breath, headaches, abdominal pain, paresthesias in the upper extremities.  No other complaints or concerns.        Plan:   Unstable angina  -Stress test positive for ischemia  -Cardiology consulted   -Left heart cath  -mild nonobstructive CAD in right circumflex, LAD  -Continuing aspirin 81 mg, management of CAD risk factors        Tobacco use   -Active use, 1/2 pk daily   -counseled on cessation  - continuing Nicotine patch       Left leg paresthesia  -Neurology consulted, MRI brain unremarkable  -Recommending outpatient EMG, focal deficit unlikely            Diet Diet NPO Exceptions are: Sips of Water with Meds   DVT Prophylaxis [] Lovenox, []  Heparin, [] SCDs, [] Ambulation,  [] Eliquis, [] Xarelto  [] Coumadin   Code Status Full Code   Disposition From: Home  Expected Disposition:   Estimated Date of Discharge:   Patient requires continued admission due to    Surrogate Decision Maker/ POA           Subjective:     Chief 
CARDIAC CATHETERIZATION REPORT    Date of Procedure: 1/6/2025  : Tyler Salazar DO  Primary Indication: Chest pain, abnormal nuclear SPECT stress test    Procedures Performed:  1. Coronary angiography  2. Left heart catheterization  3. Moderate conscious sedation    Procedural Details:  Access: Local anesthetic was given and access was obtained in the right radial artery using a micropuncture technique and a 6F Terumo Slender Sheath was placed without difficulty.    Diagnostic: A 5F TIG catheter was used to perform selective right and left coronary angiography.  The 5F TIG catheter was also used to perform the left heart catheterization.  No significant gradient was observed on pull-back of the catheter across the aortic valve.    Hemostasis: At the end of the procedure, the radial sheath was removed and a hemoband was placed over the arteriotomy site and filled with air to maintain hemostasis.    Findings:  Hemodynamics:     A. Opening arterial pressure: 93/52 (70) mmHg      B. LVEDP: 10 mmHg    2.  Coronary anatomy:  A. Left main artery: The left main artery bifurcates into the left anterior descending artery and left circumflex artery.  The left main artery has minor luminal irregularities.  B. Left anterior descending artery: Transapical vessel which gives rise to one significant diagonal artery.  The mid-LAD has sequential 20-30% and 30% stenoses.  The diagonal artery is a large vessel with minor luminal irregularities.  C. Left circumflex artery: Non-dominant vessel that gives rise to 2 obtuse marginal arteries.  The LCx has minor luminal irregularities.  The obtuse marginal arteries have minor luminal irregularities.  D. Right coronary artery: Dominant vessel that gives rise to the posterior descending artery and 2 posterolateral branches.  The RCA has a 20-30% proximal stenosis and 20% mid-vessel stenosis.  The RPDA has minor luminal irregularities.  The right posterolateral branches have minor 
Patient discharged to [facility]. IV removed with no complications, telemetry removed CMU notified. Discharge education complete with verbal understanding. All belongings sent home with patient. Patient transported via [employer].  
irregularities.     Technical Factors:  Complications:  None.  Estimated blood loss: Minimal.  Radiation:  Air kerma 355 mGy and 1.9 minutes of fluoroscopy  Sedation: Moderate conscious sedation was administered by qualified nursing personnel under continuous hemodynamic monitoring, starting at 11:29 AM and ending at 11:37 AM.  Medications: 1 mg IV Versed, 25 mcg IV Fentanyl, 5,000 units IV heparin  Contrast: 50cc of Isovue      Impression:  1. Mild non-obstructive coronary artery disease.  2. LVEDP 10 mmHg.     Plan:  1. Continue aspirin 81 mg daily and rosuvastatin 40 mg qHS.  2. Optimal medical therapy for CAD risk factors.             Echo: 01/04/2025  Echo Findings     Left Ventricle Normal left ventricular systolic function with a visually estimated EF of 55 - 60%. Left ventricle size is normal. Normal wall thickness. Normal wall motion. Grade I diastolic dysfunction with normal LAP.   Left Atrium Left atrium size appears normal.   Right Ventricle Right ventricle size appears normal. Normal systolic function. TAPSE is 2.3 cm. RV Peak S' is 12.1 cm/s.   Right Atrium Right atrium size appears normal.   Aortic Valve Not well visualized. No regurgitation. No stenosis.   Mitral Valve Mildly thickened, at the anterior leaflet. Trace regurgitation. No stenosis noted.   Tricuspid Valve Valve structure is normal. Trace regurgitation. No stenosis noted. Unable to assess RVSP due to inadequate or insignificant tricuspid regurgitation.   Pulmonic Valve The pulmonic valve was not well visualized. Trace regurgitation. No stenosis noted.   Aorta Normal sized aortic root.   IVC/Hepatic Veins IVC diameter is less than or equal to 21 mm and decreases greater than 50% during inspiration; therefore the estimated right atrial pressure is normal (~3 mmHg). IVC size is normal.   Pericardium No pericardial effusion.   Extracardiac No pleural effusion present.      Nuclear Stress Test: 01/04/2025  Interpretation Summary  Show Result

## 2025-01-07 NOTE — DISCHARGE INSTR - DIET

## 2025-01-07 NOTE — PLAN OF CARE
Problem: Discharge Planning  Goal: Discharge to home or other facility with appropriate resources  Outcome: Progressing     Problem: Safety - Adult  Goal: Free from fall injury  Outcome: Progressing     Problem: Cardiovascular - Adult  Goal: Maintains optimal cardiac output and hemodynamic stability  Outcome: Progressing     Problem: Skin/Tissue Integrity - Adult  Goal: Skin integrity remains intact  Outcome: Progressing

## (undated) DEVICE — SHIELD RAD ANGIO FEM ENTRY W/ ABSORBENT ORNG STRL RADPAD LTX

## (undated) DEVICE — RADIFOCUS OPTITORQUE ANGIOGRAPHIC CATHETER: Brand: OPTITORQUE

## (undated) DEVICE — GLIDESHEATH SLENDER STAINLESS STEEL KIT: Brand: GLIDESHEATH SLENDER

## (undated) DEVICE — GUIDEWIRE VASC L260CM DIA0.035IN RAD 3MM J TIP L7CM PTFE

## (undated) DEVICE — CATH LAB PACK: Brand: MEDLINE INDUSTRIES, INC.

## (undated) DEVICE — TR BAND RADIAL ARTERY COMPRESSION DEVICE: Brand: TR BAND

## (undated) DEVICE — SOLUTION IV HEPARIN SODIUM SODIUM CHL 0.9% 500 ML INJ VIAFLX